# Patient Record
Sex: FEMALE | Race: WHITE | NOT HISPANIC OR LATINO | Employment: UNEMPLOYED | URBAN - METROPOLITAN AREA
[De-identification: names, ages, dates, MRNs, and addresses within clinical notes are randomized per-mention and may not be internally consistent; named-entity substitution may affect disease eponyms.]

---

## 2022-09-08 ENCOUNTER — TREATMENT (OUTPATIENT)
Dept: PHYSICAL THERAPY | Facility: CLINIC | Age: 57
End: 2022-09-08

## 2022-09-08 DIAGNOSIS — R29.898 WEAKNESS OF BOTH LOWER EXTREMITIES: ICD-10-CM

## 2022-09-08 DIAGNOSIS — G35 MULTIPLE SCLEROSIS: Primary | ICD-10-CM

## 2022-09-08 DIAGNOSIS — R26.89 ABNORMALITY OF GAIT DUE TO IMPAIRMENT OF BALANCE: ICD-10-CM

## 2022-09-08 PROCEDURE — 97110 THERAPEUTIC EXERCISES: CPT | Performed by: PHYSICAL THERAPIST

## 2022-09-08 PROCEDURE — 97162 PT EVAL MOD COMPLEX 30 MIN: CPT | Performed by: PHYSICAL THERAPIST

## 2022-09-08 NOTE — PROGRESS NOTES
"  Physical Therapy Initial Evaluation and Plan of Care      Patient: Akila Goodrich   : 1965  Diagnosis/ICD-10 Code:  Multiple sclerosis (HCC) [G35]  Referring practitioner: Kristen Us MD  Date of Initial Visit: 2022  Today's Date: 2022  Patient seen for 1 sessions         Visit Diagnoses:     ICD-10-CM ICD-9-CM   1. Multiple sclerosis (HCC)  G35 340   2. Abnormality of gait due to impairment of balance  R26.89 781.2   3. Weakness of both lower extremities  R29.898 729.89       Subjective Questionnaire: LEFS: 47 ; ABC Scale: 72    Subjective Evaluation    History of Present Illness    Subjective comment: Pt is 56 y/o F who presents to therapy c/o balance issues with hx of falls secondary to MS. Pt reports she falls 3-4x per month but loses her balance and is able to catch herself multiple times per week. Pt states balance issues have been ongoing for several years but have been worsening over the last few months and states her  has been encouraging her to start PT. She reports difficulty balancing while reaching over head, walking on uneven ground, and navigating stairs without rail.Pain  No pain reported    Social Support  Lives in: multiple-level home  Lives with: spouse    Treatments  Previous treatment: medication (medication for MS)  Patient Goals  Patient goals for therapy: improved balance  Patient goal: \"be able to stand up easier\"         Objective          Strength/Myotome Testing     Left Hip   Planes of Motion   Flexion: 4-  Extension: 3+  Abduction: 4-  External rotation: 4  Internal rotation: 4    Right Hip   Planes of Motion   Flexion: 4-  Extension: 3+  Abduction: 3+  External rotation: 4  Internal rotation: 4    Left Knee   Flexion: 4-  Extension: 4+    Right Knee   Flexion: 4  Extension: 4+    Left Ankle/Foot   Dorsiflexion: 4+  Inversion: 4+  Eversion: 4+    Right Ankle/Foot   Dorsiflexion: 4+  Inversion: 4+  Eversion: 4+    Additional Strength Details  Lower abd: " 4-/5    Ambulation   Weight-Bearing Status   Assistive device used: none    Additional Weight-Bearing Status Details  Does not use AD but regularly but does take cane into her yard if she remembers    Ambulation: Level Surfaces   Ambulation without assistive device: independent    Observational Gait   Gait: asymmetric   Walking speed within functional limits.   Base of support: increased    Additional Observational Gait Details  B genu valgum, decreased B knee ext, increased pronation R, decreased df R    Functional Assessment     Single Leg Stance - Eyes Open   Left  Trial 1: 4 seconds  Trial 2: 7 seconds  Trial 3: 8 seconds  Average: 6.33 seconds     Right  Trial 1: 4 seconds  Trial 2: 2 seconds  Trial 3: 4 seconds  Average: 3.33 seconds     Comments  Sway with Romberg        Patient Education: Educated pt on HEP and POC.    Assessment & Plan     Assessment  Impairments: abnormal gait, abnormal or restricted ROM, activity intolerance, impaired balance, impaired physical strength, lacks appropriate home exercise program, safety issue and weight-bearing intolerance  Functional Limitations: walking and reaching overhead  Assessment details: Pt is 57 y.o. female who presents to therapy c/o balance issues with hx of falls secondary to MS. Per pt, she falls 3-4x per month but is able to steady herself after LOB multiple times per week. She states balance issues have been ongoing for several years but have worsened over the last few months. Denies N/T. Pt reports difficulty balancing while reaching over head, walking on uneven ground, and navigating stairs without rail. She also reports previous L knee injury, which occasionally causes pain and weakness. PMH significant for MS, arthritis, and depression. LEFS score 47, ABC Scale score 72. Deficits observed in gait, balance, BLE strength, and core strength. Pt currently ambulates without AD except on occasion when she remembers to take cane. Trialed STC in clinic today  and advised pt to utilize her cane on uneven surfaces as well in crowded situations or when she will be walking long distances. Pt requires skilled therapy addressing deficits in order to return to PLOF. Educated pt on HEP and POC, pt verbalized understanding.  Prognosis: good    Goals  Plan Goals: STG 3 Weeks:    Pt will demonstrate understanding of initial HEP without need for cueing.    Pt will demonstrate increased strength of B hip flex to 4/5 or greater for improvement stair navigation.    Pt will demonstrate safe and functional gait mechanics with use of cane for walking on uneven surfaces.    LTG by Discharge:    Pt will be independent with HEP for return to PLOF with decreased symptoms.    Pt will demonstrate increased strength of B hip flex, ext, and abd to 4+/5 or greater for greater stability in walking and navigating stairs.    Pt will ambulate community distances with most appropriate AD with gait mechanics WFL to for increased safety in ambulation.    Pt will demonstrate improvement in balance with increased SLS time to 15 sec or greater B for decreased risk of falls.    Plan  Therapy options: will be seen for skilled therapy services  Planned modality interventions: dry needling, TENS, thermotherapy (hydrocollator packs), ultrasound and cryotherapy  Planned therapy interventions: manual therapy, balance/weight-bearing training, flexibility, functional ROM exercises, gait training, home exercise program, joint mobilization, neuromuscular re-education, soft tissue mobilization, spinal/joint mobilization, strengthening, stretching and therapeutic activities  Frequency: 2x week  Duration in weeks: 12  Treatment plan discussed with: patient        History # of Personal Factors and/or Comorbidities: MODERATE (1-2)  Examination of Body System(s): # of elements: MODERATE (3)  Clinical Presentation: EVOLVING  Clinical Decision Making: MODERATE      Timed:         Manual Therapy:         mins  95188;      Therapeutic Exercise:    10     mins  70469;     Neuromuscular Norah:        mins  40779;    Therapeutic Activity:          mins  37911;     Gait Training:           mins  46731;     Ultrasound:          mins  04383;    Ionto                                   mins   74819  Self Care                            mins   99112      Un-Timed:  Electrical Stimulation:         mins  42647 ( );  Traction          mins 71837  Low Eval          Mins  72235  Mod Eval     45     Mins  73439  High Eval                            Mins  15237        Timed Treatment:   10   mins   Total Treatment:     55   mins      PT SIGNATURE: Edelmira Dawn PT   Physical Therapist  [place] License #: [id number]  [place] License #: [id number]    DATE TREATMENT INITIATED: 9/8/2022    Initial Certification  Certification Period: 9/8/2022 thru 12/6/2022  I certify that the therapy services are furnished while this patient is under my care.  The services outlined above are required by this patient, and will be reviewed every 90 days.      Physician Signature: _________________________  PHYSICIAN: Kristen Us MD   NPI: [id number]                                             DATE: _____________________________________    Please sign and return via fax to [phone]. Thank you, [facility].

## 2022-09-13 ENCOUNTER — TREATMENT (OUTPATIENT)
Dept: PHYSICAL THERAPY | Facility: CLINIC | Age: 57
End: 2022-09-13

## 2022-09-13 DIAGNOSIS — R29.898 WEAKNESS OF BOTH LOWER EXTREMITIES: ICD-10-CM

## 2022-09-13 DIAGNOSIS — R26.89 ABNORMALITY OF GAIT DUE TO IMPAIRMENT OF BALANCE: ICD-10-CM

## 2022-09-13 DIAGNOSIS — G35 MULTIPLE SCLEROSIS: Primary | ICD-10-CM

## 2022-09-13 PROCEDURE — 97530 THERAPEUTIC ACTIVITIES: CPT | Performed by: PHYSICAL THERAPIST

## 2022-09-13 PROCEDURE — 97112 NEUROMUSCULAR REEDUCATION: CPT | Performed by: PHYSICAL THERAPIST

## 2022-09-13 PROCEDURE — 97110 THERAPEUTIC EXERCISES: CPT | Performed by: PHYSICAL THERAPIST

## 2022-09-13 NOTE — PROGRESS NOTES
Physical Therapy Daily Treatment Note      Patient: Akila Goodrich   : 1965  Referring practitioner: Kristen Us MD  Date of Initial Visit: Type: THERAPY  Noted: 2022  Today's Date: 2022  Patient seen for 2 sessions         Akila Goodrich reports: I have been doing well with my exercises; no increased pain with exercises         Objective   See Exercise, Manual, and Modality Logs for complete treatment.       Assessment/Plan  Progressed balance and gait exercises today; updated and issued HEP. Pt with noted increased sway with narrow BROOKE on floor and foam; educated on balance strategies. Pt reports difficulty picking up her feet with gait; added dynamic gait today with most difficulty with tandem gait. Plan to add cup walks next session to improve foot clearance with gait and continue to progress static and dynamic balance.     Progress per Plan of Care and Progress strengthening /stabilization /functional activity           Timed:  Manual Therapy:    -     mins  97230;  Therapeutic Exercise:    20     mins  45022;     Neuromuscular Norah:    8    mins  55480;    Therapeutic Activity:     12     mins  54186;     Gait Training:      -     mins  50268;     Ultrasound:     -     mins  93246;      Untimed:  Electrical Stimulation:    -     mins  09822 ( );  Mechanical Traction:    -     mins  86856;   Dry needling:      -     mins  17866/    Timed Treatment:   40   mins   Total Treatment:     45   mins    Bertha Duran PT  Physical Therapist  License #: [id number]

## 2022-09-15 ENCOUNTER — TREATMENT (OUTPATIENT)
Dept: PHYSICAL THERAPY | Facility: CLINIC | Age: 57
End: 2022-09-15

## 2022-09-15 DIAGNOSIS — R26.89 ABNORMALITY OF GAIT DUE TO IMPAIRMENT OF BALANCE: ICD-10-CM

## 2022-09-15 DIAGNOSIS — R29.898 WEAKNESS OF BOTH LOWER EXTREMITIES: ICD-10-CM

## 2022-09-15 DIAGNOSIS — G35 MULTIPLE SCLEROSIS: Primary | ICD-10-CM

## 2022-09-15 PROCEDURE — 97530 THERAPEUTIC ACTIVITIES: CPT | Performed by: PHYSICAL THERAPIST

## 2022-09-15 PROCEDURE — 97112 NEUROMUSCULAR REEDUCATION: CPT | Performed by: PHYSICAL THERAPIST

## 2022-09-15 PROCEDURE — 97110 THERAPEUTIC EXERCISES: CPT | Performed by: PHYSICAL THERAPIST

## 2022-09-15 NOTE — PROGRESS NOTES
Physical Therapy Daily Treatment Note    Patient: Akila Goodrich   : 1965  Referring practitioner: Kristen Us MD  Date of Initial Visit: Type: THERAPY  Noted: 2022  Today's Date: 9/15/2022  Patient seen for 3 sessions       Visit Diagnoses:    ICD-10-CM ICD-9-CM   1. Multiple sclerosis (HCC)  G35 340   2. Abnormality of gait due to impairment of balance  R26.89 781.2   3. Weakness of both lower extremities  R29.898 729.89       Akila Goodrich reports: she denies having any increased pain or soreness from her last visit.      Subjective       Objective   See Exercise, Manual, and Modality Logs for complete treatment.       Assessment & Plan     Assessment    Assessment details: Pt is doing well with stabilization, gait, balance and LE strengthening exercises.  Pt denies having any increased pain or soreness from the exercises.  Pt does need verbal cueing to heel strike to toe off during gait.  Initiated cup walking today and pt was able to demonstrate some carry over into her normal gait after working on increasing step lengths and heel striking during cup walking.  Will continue to see pt 2x/week for gait, strengthening, and balance.            Progress per Plan of Care      Timed:         Manual Therapy:         mins  41496;     Therapeutic Exercise:   22      mins  18671;     Neuromuscular Norah:   8     mins  90565;    Therapeutic Activity:     12     mins  04820;     Gait Training:           mins  47940;     Ultrasound:          mins  82683;    Ionto                                   mins   76187  Self Care                            mins   60083  Canalith Repos         mins 63089      Un-Timed:  Electrical Stimulation:         mins  90901 ( );  Dry Needling          mins self-pay  Traction          mins 29282      Timed Treatment:  42    mins   Total Treatment:     42   mins    Arlette Teresa, PT  [place] License: [id number]

## 2022-09-20 ENCOUNTER — TREATMENT (OUTPATIENT)
Dept: PHYSICAL THERAPY | Facility: CLINIC | Age: 57
End: 2022-09-20

## 2022-09-20 DIAGNOSIS — R29.898 WEAKNESS OF BOTH LOWER EXTREMITIES: ICD-10-CM

## 2022-09-20 DIAGNOSIS — G35 MULTIPLE SCLEROSIS: Primary | ICD-10-CM

## 2022-09-20 DIAGNOSIS — R26.89 ABNORMALITY OF GAIT DUE TO IMPAIRMENT OF BALANCE: ICD-10-CM

## 2022-09-20 PROCEDURE — 97110 THERAPEUTIC EXERCISES: CPT | Performed by: PHYSICAL THERAPIST

## 2022-09-20 PROCEDURE — 97112 NEUROMUSCULAR REEDUCATION: CPT | Performed by: PHYSICAL THERAPIST

## 2022-09-20 PROCEDURE — 97530 THERAPEUTIC ACTIVITIES: CPT | Performed by: PHYSICAL THERAPIST

## 2022-09-20 NOTE — PROGRESS NOTES
Physical Therapy Daily Treatment Note      Patient: Akila Goodrich   : 1965  Referring practitioner: Kristen Us MD  Date of Initial Visit: Type: THERAPY  Noted: 2022  Today's Date: 2022  Patient seen for 4 sessions         Akila Goodrich reports: no soreness or pain; no falls since initiation of therapy        Objective   See Exercise, Manual, and Modality Logs for complete treatment.       Assessment/Plan  Continued to progress strength and balance; minimal cues for mat table strengthening exercises. Added nustep, cup walks, dynamic UE exercises on foam surface, uni-clock and rocker board static balance holds today for improved strength, endurance and balance with functional mobility. Pt needing intermittent hand hold assist/CGA for tandem gait and initially with cup walks and intermittent use of handrail with rocker board balance. Plan to continue to progress as appropriate to decrease falls risk.       Progress per Plan of Care and Progress strengthening /stabilization /functional activity         Timed:  Manual Therapy:    -     mins  11301;  Therapeutic Exercise:    25     mins  81590;     Neuromuscular Norah:    18    mins  81211;    Therapeutic Activity:     10     mins  97554;     Gait Training:      -     mins  05237;     Ultrasound:     -     mins  56336;      Untimed:  Electrical Stimulation:    -     mins  74772 ( );  Mechanical Traction:    -     mins  78247;   Dry needling:      -     mins  81640/    Timed Treatment:   53   mins   Total Treatment:     55   mins  Bertha Duran PT  Physical Therapist    License #: [id number]

## 2022-09-22 ENCOUNTER — TREATMENT (OUTPATIENT)
Dept: PHYSICAL THERAPY | Facility: CLINIC | Age: 57
End: 2022-09-22

## 2022-09-22 DIAGNOSIS — R26.89 ABNORMALITY OF GAIT DUE TO IMPAIRMENT OF BALANCE: ICD-10-CM

## 2022-09-22 DIAGNOSIS — R29.898 WEAKNESS OF BOTH LOWER EXTREMITIES: ICD-10-CM

## 2022-09-22 DIAGNOSIS — G35 MULTIPLE SCLEROSIS: Primary | ICD-10-CM

## 2022-09-22 PROCEDURE — 97110 THERAPEUTIC EXERCISES: CPT | Performed by: PHYSICAL THERAPIST

## 2022-09-22 PROCEDURE — 97112 NEUROMUSCULAR REEDUCATION: CPT | Performed by: PHYSICAL THERAPIST

## 2022-09-22 NOTE — PROGRESS NOTES
Physical Therapy Daily Treatment Note      Patient: Akila Goodrich   : 1965  Referring practitioner: Kristen Us MD  Date of Initial Visit: Type: THERAPY  Noted: 2022  Today's Date: 2022  Patient seen for 5 sessions       Visit Diagnoses:    ICD-10-CM ICD-9-CM   1. Multiple sclerosis (HCC)  G35 340   2. Abnormality of gait due to impairment of balance  R26.89 781.2   3. Weakness of both lower extremities  R29.898 729.89       Subjective   Akila Goodrich reports: doing well with HEP and feeling more stable.  Pt notes that it is getting easier to roll over.     Objective   See Exercise, Manual, and Modality Logs for complete treatment.       Assessment/Plan   Pt noting fatigue with SLR/mild extensor lag requiring rest breaks. Pt demonstrates good form with mat ex requiring few cues. Pt was challenge with single leg support activities and benefits from verbal cuing to achieve hip dominant squat with good knee alignment.   Progress per Plan of Care             Timed:         Manual Therapy:    -     mins  27626;     Therapeutic Exercise:    20     mins  93774;     Neuromuscular Norah:    25    mins  83414;    Therapeutic Activity:     -     mins  70783;     Gait Training:      -     mins  61094;     Ultrasound:     -     mins  26962;    Ionto                               -    mins   92048  Self Care                       -     mins   77847        Timed Treatment:   45   mins   Total Treatment:     50   mins    KACI Garay License: #[id number]

## 2022-09-27 ENCOUNTER — TREATMENT (OUTPATIENT)
Dept: PHYSICAL THERAPY | Facility: CLINIC | Age: 57
End: 2022-09-27

## 2022-09-27 DIAGNOSIS — G35 MULTIPLE SCLEROSIS: Primary | ICD-10-CM

## 2022-09-27 DIAGNOSIS — R29.898 WEAKNESS OF BOTH LOWER EXTREMITIES: ICD-10-CM

## 2022-09-27 DIAGNOSIS — R26.89 ABNORMALITY OF GAIT DUE TO IMPAIRMENT OF BALANCE: ICD-10-CM

## 2022-09-27 PROCEDURE — 97112 NEUROMUSCULAR REEDUCATION: CPT | Performed by: PHYSICAL THERAPIST

## 2022-09-27 PROCEDURE — 97110 THERAPEUTIC EXERCISES: CPT | Performed by: PHYSICAL THERAPIST

## 2022-09-27 PROCEDURE — 97530 THERAPEUTIC ACTIVITIES: CPT | Performed by: PHYSICAL THERAPIST

## 2022-09-27 NOTE — PROGRESS NOTES
"   Physical Therapy Daily Treatment Note      Patient: Akila Goodrich   : 1965  Referring practitioner: Kristen Us MD  Date of Initial Visit: Type: THERAPY  Noted: 2022  Today's Date: 2022  Patient seen for 6 sessions         Akila Goodrich reports: getting on and off the floor is getting easier; L knee hurt after little last session, tightness noted today with knee flexion. Pt reports she is a little tired and fatigued today with noted increased imbalance with gait around the clinic; \"just one of those days with my MS.\" Pt reports she has \"those days\" intermittently randomly; unrelated to previous activity.      Objective   See Exercise, Manual, and Modality Logs for complete treatment.       Assessment/Plan   No additional exercises added today due to increased fatigue and imbalance with MS today. Noted decreased foot clearance and forward trunk with gait today; cues for posture with standing and increased need for UE support with balance activities today. Improving functional strength noted with bed mobility on/off mat today. Pt requires cues for squat technique today with visual/verbal cues and demonstration with improved understanding. No c/o increased knee pain with exercises today.       Progress per Plan of Care and Progress strengthening /stabilization /functional activity           Timed:  Manual Therapy:    -     mins  16754;  Therapeutic Exercise:    31     mins  28176;     Neuromuscular Norah:    13    mins  40873;    Therapeutic Activity:     10     mins  29457;     Gait Training:      -     mins  03619;     Ultrasound:     -     mins  66091;      Untimed:  Electrical Stimulation:    -     mins  29469 ( );  Mechanical Traction:    -     mins  18758;   Dry needling:      -     mins  05263/    Timed Treatment:   54   mins   Total Treatment:     60   mins    Bertha Duran PT  Physical Therapist  License #: [id number]                "

## 2022-09-29 ENCOUNTER — TREATMENT (OUTPATIENT)
Dept: PHYSICAL THERAPY | Facility: CLINIC | Age: 57
End: 2022-09-29

## 2022-09-29 DIAGNOSIS — R29.898 WEAKNESS OF BOTH LOWER EXTREMITIES: ICD-10-CM

## 2022-09-29 DIAGNOSIS — G35 MULTIPLE SCLEROSIS: Primary | ICD-10-CM

## 2022-09-29 DIAGNOSIS — R26.89 ABNORMALITY OF GAIT DUE TO IMPAIRMENT OF BALANCE: ICD-10-CM

## 2022-09-29 PROCEDURE — 97112 NEUROMUSCULAR REEDUCATION: CPT | Performed by: PHYSICAL THERAPIST

## 2022-09-29 PROCEDURE — 97110 THERAPEUTIC EXERCISES: CPT | Performed by: PHYSICAL THERAPIST

## 2022-09-29 NOTE — PROGRESS NOTES
Physical Therapy Daily Treatment Note      Patient: Akila Goodrich   : 1965  Referring practitioner: Kristen Us MD  Date of Initial Visit: Type: THERAPY  Noted: 2022  Today's Date: 2022  Patient seen for 7 sessions       Visit Diagnoses:    ICD-10-CM ICD-9-CM   1. Multiple sclerosis (HCC)  G35 340   2. Abnormality of gait due to impairment of balance  R26.89 781.2   3. Weakness of both lower extremities  R29.898 729.89       Subjective   Akila Goodrich reports: my L knee is bothering me still    Objective   See Exercise, Manual, and Modality Logs for complete treatment.       Assessment/Plan   Vestibular ex were modified to offload L knee as much as possible with good tolerance.  Emphasis on SBA only to allow pt to be more aware of balance deficits. Pt was educated on benefits of ankle strategy to prevent falls. Added [employer] pew with good technique to address limits of stability with printout for compliance/recall.  Progress per Plan of Care             Timed:         Manual Therapy:    -     mins  83401;     Therapeutic Exercise:    20     mins  80888;     Neuromuscular Norah:    30    mins  42526;    Therapeutic Activity:     -     mins  60596;     Gait Training:      -     mins  17231;     Ultrasound:     -     mins  05910;    Ionto                               -    mins   71526  Self Care                       -     mins   18034      Timed Treatment:   50   mins   Total Treatment:     50   mins    KACI Garay License: #[id number]

## 2022-10-18 ENCOUNTER — OFFICE VISIT (OUTPATIENT)
Dept: FAMILY MEDICINE CLINIC | Facility: CLINIC | Age: 57
End: 2022-10-18

## 2022-10-18 VITALS
DIASTOLIC BLOOD PRESSURE: 80 MMHG | OXYGEN SATURATION: 100 % | TEMPERATURE: 96.9 F | WEIGHT: 231 LBS | SYSTOLIC BLOOD PRESSURE: 127 MMHG | HEIGHT: 68 IN | HEART RATE: 71 BPM | BODY MASS INDEX: 35.01 KG/M2

## 2022-10-18 DIAGNOSIS — Z76.89 ENCOUNTER TO ESTABLISH CARE: Primary | ICD-10-CM

## 2022-10-18 DIAGNOSIS — G35 MULTIPLE SCLEROSIS: ICD-10-CM

## 2022-10-18 DIAGNOSIS — M25.50 POLYARTHRALGIA: ICD-10-CM

## 2022-10-18 DIAGNOSIS — F33.41 RECURRENT MAJOR DEPRESSIVE DISORDER, IN PARTIAL REMISSION: ICD-10-CM

## 2022-10-18 DIAGNOSIS — Z00.00 HEALTHCARE MAINTENANCE: ICD-10-CM

## 2022-10-18 DIAGNOSIS — Z23 FLU VACCINE NEED: ICD-10-CM

## 2022-10-18 DIAGNOSIS — F51.04 PSYCHOPHYSIOLOGICAL INSOMNIA: ICD-10-CM

## 2022-10-18 DIAGNOSIS — K21.9 GASTROESOPHAGEAL REFLUX DISEASE, UNSPECIFIED WHETHER ESOPHAGITIS PRESENT: ICD-10-CM

## 2022-10-18 PROCEDURE — 90471 IMMUNIZATION ADMIN: CPT | Performed by: NURSE PRACTITIONER

## 2022-10-18 PROCEDURE — 99204 OFFICE O/P NEW MOD 45 MIN: CPT | Performed by: NURSE PRACTITIONER

## 2022-10-18 PROCEDURE — 90686 IIV4 VACC NO PRSV 0.5 ML IM: CPT | Performed by: NURSE PRACTITIONER

## 2022-10-18 RX ORDER — TRAMADOL HYDROCHLORIDE 50 MG/1
TABLET ORAL
COMMUNITY
Start: 2022-07-31 | End: 2023-02-01 | Stop reason: SDUPTHER

## 2022-10-18 RX ORDER — CYCLOBENZAPRINE HCL 10 MG
1 TABLET ORAL 3 TIMES DAILY PRN
COMMUNITY
Start: 2022-05-19 | End: 2023-02-01 | Stop reason: SDUPTHER

## 2022-10-18 RX ORDER — OXYBUTYNIN CHLORIDE 15 MG/1
15 TABLET, EXTENDED RELEASE ORAL DAILY
COMMUNITY
Start: 2022-08-14 | End: 2023-02-01 | Stop reason: SDUPTHER

## 2022-10-18 RX ORDER — MELOXICAM 15 MG/1
15 TABLET ORAL DAILY
COMMUNITY
Start: 2022-08-14 | End: 2023-02-01 | Stop reason: SDUPTHER

## 2022-10-18 RX ORDER — OXYBUTYNIN CHLORIDE 5 MG/1
5 TABLET, EXTENDED RELEASE ORAL DAILY
COMMUNITY
Start: 2022-08-14 | End: 2023-02-01 | Stop reason: SDUPTHER

## 2022-10-18 RX ORDER — FLUTICASONE PROPIONATE 50 MCG
2 SPRAY, SUSPENSION (ML) NASAL DAILY
COMMUNITY

## 2022-10-18 RX ORDER — GLATIRAMER 40 MG/ML
INJECTION, SOLUTION SUBCUTANEOUS
COMMUNITY
Start: 2022-08-18

## 2022-10-18 RX ORDER — OMEPRAZOLE 20 MG/1
20 CAPSULE, DELAYED RELEASE ORAL DAILY
COMMUNITY

## 2022-10-18 RX ORDER — CETIRIZINE HYDROCHLORIDE 10 MG/1
10 TABLET ORAL DAILY
COMMUNITY

## 2022-10-18 RX ORDER — BUPROPION HYDROCHLORIDE 150 MG/1
150 TABLET ORAL
COMMUNITY
End: 2023-02-01 | Stop reason: SDUPTHER

## 2022-10-18 NOTE — PROGRESS NOTES
"Chief Complaint  Establish Care (New pt )    Subjective        Akila Goodrich presents to [facility] PRIMARY [facility]ARE  History of Present Illness   Referral to GYN and neuro for MS  Left trigger finger  Polyarthralgia  B/l hips and LBP  Colon cancer     The patient Akila Goodrich is a 56-year-old female who presents today as a new patient to establish care. She is accompanied by her granddaughter today.    Medical history  She reports previously following with Dr. Aniceto Rizvi MD, PCP, at [place], [place]. She has requested a referral for a physician that feels more personable. She reports currently seeing all providers in [facility]. She prefers to have new referrals for all providers to a closer location and [facility] facility. She would also like a referral for a neurologist within this current location/building. Her most recent physical examination was completed in 03/2022.     Family history  The patient reports completing genetic testing for cancer screening. She reports 2 sisters had breast cancer. Her mother had colon cancer and father had liver cancer.     Multiple sclerosis, fatigue, and fall injuries  She reports doing well with her multiple sclerosis. She does follow up with neurology. The patient reports constant symptoms of fatigue. She is able to awake in the morning but feels exhausted. She stopped working in the past due to a lack of staying power and then returned to work for 1 month. She reports previously doing physical therapy at [facility], operated by [facility] with good improvement. However, she does report having a \"horrible\" balance. The patient reports being high risk for falls due to feeling unbalanced. She previously had large bruises of the legs from falling, according to the granddaughter. She reports falling on a pile of clothes after walking in her daughter's room most recently. She also fell after tripping over her dog and was " "not able to get up and crawled over to the kitchen chair. She reports starting to use a cane, a walking cane, or someone's arm when walking. She reports managing her symptoms well. She is seen at the [facility].  Doing well on current therapy other than brain fog.  She also reports symptoms of brain fog.    Anxiety  The patient reports having symptoms of anxiety when driving in traffic-has trouble driving much distance which is another reason she would like to have providers here at [facility] as possible    Depression  She reports discontinuing Wellbutrin in the past. She reports having family stress due to the passing of her father and moved to [place] aft leaving [place]. She then re-started the Wellbutrin 300 mg due to work and family stress, 2021. The patient reports being recommended by Dr. Rizvi to taper off the Wellbutrin 150 mg. She discontinued use again and then returned to the medication due to current symptoms. She takes Wellbutrin in the morning.    Insomnia   She reports falling asleep on the couch and then moves to the bed and is unable to sleep. Her brain stays awake and then she gets up and uses her phone. She eventually falls asleep. She denies taking sleep medication. She denies ever taking melatonin.     Arthritis  She takes meloxicam for arthritis. She also reports occasionally having trigger finger symptom in the morning. The patient reports \"popping\" her finger due to stiffness but reports having pain afterwards.     Bursitis, hip, lumbar spine, and bilateral shoulders  She has symptoms of bursitis of the hip and spine. She reports seeing a physician in [place] due to hip and severe lumbar spine pain and was diagnosed with bursitis.  She reports severe lumbar spine pain when touched. The physician recommended an injection or spine and hip exercises, during this time. She reports trying to do lay down exercises every morning before getting out of " "bed. She also reports doing stability exercises. She reports having difficulty doing certain exercises due to knee pain when lifting. She reports still having pain after taking meloxicam. She does manage her pain. She takes tramadol \"very rarely\" and cyclobenzaprine when having severe pain. She refilled the tramadol in 07/2022. She denies having middle spine pain. She reports occasionally having radiating pain across her shoulders.     Knee pain  The patient reports symptoms of knee pain.     Acid reflux and heart burn  She takes Prilosec for acid reflux. She drinks pickle juice at night when having acid reflux with some relief. She reports previously completing an endoscopy  in [place]. She reports having a small tear, but her symptom did not require medical treatment according to Dr. Maher at [facility]. She was recommended to monitor her diet and take Prilosec in the morning, daily.    Urinary conditions, history of UTI  She reports taking oxybutynin for urinary abnormalities/conditions. She denies having a UTI in a while. She reports previously having re-occurring UTI symptoms/infections.     History of bilateral legs and right arm weakness  She reports previously having symptoms of increased weakness of the right arm and bilateral legs. She reports doing PT during this time with good effectiveness. She has been referred for an MRI of the neck and spine due to symptoms of weakness. She denies receiving a call to schedule the MRI of the spine and neck. She is to complete or schedule the imaging appointment through [facility].     History of the COVID-19 virus  The patient reports vladimir the COVID-19 virus twice, 1 time in 2019 and another time in 05/2022 or 07/2022.      Health maintenance   She denies having any abnormal mammograms in the past. She reports receiving the flu vaccinations, regularly. She denies receiving the flu vaccine this year. She refuses the COVID-19 " "vaccine, today. She reports having a colonoscopy in 2019 before leaving [place]. She reports having a polyp in the first colonoscopy. She denies having any discomfort due to the polyp. She reports having normal results in the second colonoscopy, 2019. She is due for a colonoscopy in 2023.     Medical questions  The patient denies having any colon conditions or abnormal bowel movements. She denies any chest pain, heart palpitations, shortness of breath, or cough. She has never had to see a cardiologist for any reason. She denies having any kidney conditions, dizziness, ear or throat abnormalities, trouble swallowing, head pain or dizziness. She reports drinking plenty of water.       Objective   Vital Signs:  /80   Pulse 71   Temp 96.9 °F (36.1 °C)   Ht 172.7 cm (68\")   Wt 105 kg (231 lb)   SpO2 100%   BMI 35.12 kg/m²   Estimated body mass index is 35.12 kg/m² as calculated from the following:    Height as of this encounter: 172.7 cm (68\").    Weight as of this encounter: 105 kg (231 lb).    Class 2 Severe Obesity (BMI >=35 and <=39.9). Obesity-related health conditions include the following: GERD and osteoarthritis. Obesity is newly identified. BMI is is above average; BMI management plan is completed. We discussed portion control and increasing exercise.      Physical Exam  Vitals and nursing note reviewed.   Constitutional:       General: She is not in acute distress.     Appearance: She is well-developed. She is not ill-appearing or diaphoretic.   HENT:      Head: Normocephalic and atraumatic.      Right Ear: Tympanic membrane, ear canal and external ear normal.      Left Ear: Tympanic membrane, ear canal and external ear normal.      Mouth/Throat:      Mouth: Mucous membranes are moist.      Pharynx: No posterior oropharyngeal erythema.   Eyes:      General: No scleral icterus.        Right eye: No discharge.         Left eye: No discharge.      Conjunctiva/sclera: Conjunctivae normal. "   Cardiovascular:      Rate and Rhythm: Normal rate and regular rhythm.      Heart sounds: Normal heart sounds.   Pulmonary:      Effort: Pulmonary effort is normal.      Breath sounds: Normal breath sounds.   Abdominal:      General: Bowel sounds are normal.      Palpations: Abdomen is soft.      Tenderness: There is no abdominal tenderness.   Musculoskeletal:         General: No deformity.      Comments: Generalized weakness-steady to ambulate without assist-abnl gait   Skin:     General: Skin is warm and dry.   Neurological:      Mental Status: She is alert and oriented to person, place, and time.   Psychiatric:         Mood and Affect: Mood normal.         Behavior: Behavior normal.         Thought Content: Thought content normal.      Comments: Pleasant, conversant, had to reach for words occasionally        Result Review :                Assessment and Plan   Diagnoses and all orders for this visit:    1. Encounter to establish care (Primary)    2. Multiple sclerosis (HCC)  -     Ambulatory Referral to Neurology    3. Polyarthralgia    4. Psychophysiological insomnia    5. Flu vaccine need  -     FluLaval/Fluzone >6 mos ([id number])    6. Healthcare maintenance  -     Ambulatory Referral to Gynecology      Multiple sclerosis, fatigue, and fall injuries  - The patient will continue to follow up with the [facility].  - The patient will have an MRI of the brain,  neck and spine due to symptoms of weakness at [facility].   11/29/2022.   - She can follow up with PT.   - The patient has requested a new neurologist provider in the area.     - The patient will follow up in 3 months.  - She will continue to use assistive devices for ambulation  -Discussed importance of for fall prevention     Anxiety and depression, family and work stress  - The patient will continue to take Wellbutrin 150 mg in the morning.     polyarthralgia  - The patient will complete an MRI of the neck  and thoracic spine.  -- The patient will have a controlled substance agreement today.  -yuridia reviewed and appropriate-will continue tramadol for chronic polyarthralgia.   -UDS next visit  - She will continue taking meloxicam and cyclobenzaprine prn as primaries when having severe pain.  Stretching and stability exercise encouraged    . Mammogram  - The patient is scheduled to have a mammogram on 11/29/2022, ordered by the GYN.  - She has requested a new referral for GYN.      Insomnia   - She has been recommended to take melatonin 5 mg to start and then 10 mg.     Acid reflux and heart burn  - She will continue taking Prilosec for acid reflux.   - She will continue to monitor her diet.   - She can follow up with Dr. Maher at [facility].     Urinary conditions, history of UTI  - She will continue taking oxybutynin for urinary abnormalities/conditions per neuro     Flu vaccine  - She will receive a flu vaccine, today.              Follow Up   Return in about 3 months (around 1/18/2023) for Recheck.  Patient was given instructions and counseling regarding her condition or for health maintenance advice. Please see specific information pulled into the AVS if appropriate.       Transcribed from ambient dictation for ADRIAN Nina by Val Merrill.  10/18/22   21:19 EDT    Patient or patient representative verbalized consent to the visit recording.  I have personally performed the services described in this document as transcribed by the above individual, and it is both accurate and complete.

## 2022-10-19 PROBLEM — K21.9 GASTROESOPHAGEAL REFLUX DISEASE: Status: ACTIVE | Noted: 2022-10-19

## 2022-10-19 PROBLEM — F51.04 PSYCHOPHYSIOLOGICAL INSOMNIA: Status: ACTIVE | Noted: 2022-10-19

## 2022-10-19 PROBLEM — F33.41 RECURRENT MAJOR DEPRESSIVE DISORDER, IN PARTIAL REMISSION: Status: ACTIVE | Noted: 2022-10-19

## 2022-10-19 PROBLEM — G35 MULTIPLE SCLEROSIS: Status: ACTIVE | Noted: 2022-10-19

## 2022-10-19 PROBLEM — M25.50 POLYARTHRALGIA: Status: ACTIVE | Noted: 2022-10-19

## 2022-10-20 ENCOUNTER — PATIENT ROUNDING (BHMG ONLY) (OUTPATIENT)
Dept: FAMILY MEDICINE CLINIC | Facility: CLINIC | Age: 57
End: 2022-10-20

## 2022-10-20 NOTE — PROGRESS NOTES
A My-Chart message has been sent to the patient for PATIENT ROUNDING with [facility]

## 2023-02-01 ENCOUNTER — OFFICE VISIT (OUTPATIENT)
Dept: FAMILY MEDICINE CLINIC | Facility: CLINIC | Age: 58
End: 2023-02-01
Payer: COMMERCIAL

## 2023-02-01 ENCOUNTER — LAB (OUTPATIENT)
Dept: LAB | Facility: HOSPITAL | Age: 58
End: 2023-02-01
Payer: COMMERCIAL

## 2023-02-01 VITALS
DIASTOLIC BLOOD PRESSURE: 85 MMHG | HEART RATE: 67 BPM | OXYGEN SATURATION: 100 % | BODY MASS INDEX: 35.77 KG/M2 | SYSTOLIC BLOOD PRESSURE: 112 MMHG | TEMPERATURE: 97.1 F | HEIGHT: 68 IN | WEIGHT: 236 LBS

## 2023-02-01 DIAGNOSIS — M54.50 CHRONIC BILATERAL LOW BACK PAIN WITHOUT SCIATICA: ICD-10-CM

## 2023-02-01 DIAGNOSIS — G35 MULTIPLE SCLEROSIS: ICD-10-CM

## 2023-02-01 DIAGNOSIS — M25.50 POLYARTHRALGIA: ICD-10-CM

## 2023-02-01 DIAGNOSIS — F33.41 RECURRENT MAJOR DEPRESSIVE DISORDER, IN PARTIAL REMISSION: ICD-10-CM

## 2023-02-01 DIAGNOSIS — N32.81 OVERACTIVE BLADDER: ICD-10-CM

## 2023-02-01 DIAGNOSIS — G89.29 CHRONIC BILATERAL LOW BACK PAIN WITHOUT SCIATICA: ICD-10-CM

## 2023-02-01 DIAGNOSIS — Z00.00 ROUTINE GENERAL MEDICAL EXAMINATION AT A HEALTH CARE FACILITY: Primary | ICD-10-CM

## 2023-02-01 PROBLEM — R13.10 DYSPHAGIA: Status: ACTIVE | Noted: 2019-11-18

## 2023-02-01 PROBLEM — E04.1 THYROID NODULE: Status: ACTIVE | Noted: 2017-05-04

## 2023-02-01 PROBLEM — K44.9 PARAESOPHAGEAL HERNIA: Status: ACTIVE | Noted: 2022-03-01

## 2023-02-01 PROBLEM — Z15.01 BRCA1 GENE MUTATION POSITIVE: Status: ACTIVE | Noted: 2022-04-22

## 2023-02-01 PROBLEM — E78.5 HYPERLIPIDEMIA: Status: ACTIVE | Noted: 2022-03-02

## 2023-02-01 PROBLEM — Z15.09 BRCA1 GENE MUTATION POSITIVE: Status: ACTIVE | Noted: 2022-04-22

## 2023-02-01 PROBLEM — Z80.0 FAMILY HISTORY OF COLON CANCER: Status: ACTIVE | Noted: 2019-11-18

## 2023-02-01 PROBLEM — Z84.81 FAMILY HISTORY OF BRCA GENE POSITIVE: Status: ACTIVE | Noted: 2022-03-30

## 2023-02-01 LAB
ALBUMIN SERPL-MCNC: 4.1 G/DL (ref 3.5–5.2)
ALBUMIN/GLOB SERPL: 1.2 G/DL
ALP SERPL-CCNC: 73 U/L (ref 39–117)
ALT SERPL W P-5'-P-CCNC: 12 U/L (ref 1–33)
ANION GAP SERPL CALCULATED.3IONS-SCNC: 7.8 MMOL/L (ref 5–15)
AST SERPL-CCNC: 20 U/L (ref 1–32)
BASOPHILS # BLD AUTO: 0.1 10*3/MM3 (ref 0–0.2)
BASOPHILS NFR BLD AUTO: 1.6 % (ref 0–1.5)
BILIRUB SERPL-MCNC: 0.5 MG/DL (ref 0–1.2)
BUN SERPL-MCNC: 23 MG/DL (ref 6–20)
BUN/CREAT SERPL: 25.3 (ref 7–25)
CALCIUM SPEC-SCNC: 9.4 MG/DL (ref 8.6–10.5)
CHLORIDE SERPL-SCNC: 106 MMOL/L (ref 98–107)
CHOLEST SERPL-MCNC: 185 MG/DL (ref 0–200)
CO2 SERPL-SCNC: 26.2 MMOL/L (ref 22–29)
CREAT SERPL-MCNC: 0.91 MG/DL (ref 0.57–1)
DEPRECATED RDW RBC AUTO: 44.1 FL (ref 37–54)
EGFRCR SERPLBLD CKD-EPI 2021: 73.7 ML/MIN/1.73
EOSINOPHIL # BLD AUTO: 0.19 10*3/MM3 (ref 0–0.4)
EOSINOPHIL NFR BLD AUTO: 3.1 % (ref 0.3–6.2)
ERYTHROCYTE [DISTWIDTH] IN BLOOD BY AUTOMATED COUNT: 13.4 % (ref 12.3–15.4)
GLOBULIN UR ELPH-MCNC: 3.3 GM/DL
GLUCOSE SERPL-MCNC: 95 MG/DL (ref 65–99)
HBA1C MFR BLD: 5.5 % (ref 4.8–5.6)
HCT VFR BLD AUTO: 40.8 % (ref 34–46.6)
HDLC SERPL-MCNC: 70 MG/DL (ref 40–60)
HGB BLD-MCNC: 12.8 G/DL (ref 12–15.9)
IMM GRANULOCYTES # BLD AUTO: 0.02 10*3/MM3 (ref 0–0.05)
IMM GRANULOCYTES NFR BLD AUTO: 0.3 % (ref 0–0.5)
LDLC SERPL CALC-MCNC: 105 MG/DL (ref 0–100)
LDLC/HDLC SERPL: 1.49 {RATIO}
LYMPHOCYTES # BLD AUTO: 1.92 10*3/MM3 (ref 0.7–3.1)
LYMPHOCYTES NFR BLD AUTO: 31.2 % (ref 19.6–45.3)
MCH RBC QN AUTO: 28.4 PG (ref 26.6–33)
MCHC RBC AUTO-ENTMCNC: 31.4 G/DL (ref 31.5–35.7)
MCV RBC AUTO: 90.5 FL (ref 79–97)
MONOCYTES # BLD AUTO: 0.92 10*3/MM3 (ref 0.1–0.9)
MONOCYTES NFR BLD AUTO: 15 % (ref 5–12)
NEUTROPHILS NFR BLD AUTO: 3 10*3/MM3 (ref 1.7–7)
NEUTROPHILS NFR BLD AUTO: 48.8 % (ref 42.7–76)
NRBC BLD AUTO-RTO: 0 /100 WBC (ref 0–0.2)
PLATELET # BLD AUTO: 196 10*3/MM3 (ref 140–450)
PMV BLD AUTO: 9.2 FL (ref 6–12)
POTASSIUM SERPL-SCNC: 4.8 MMOL/L (ref 3.5–5.2)
PROT SERPL-MCNC: 7.4 G/DL (ref 6–8.5)
RBC # BLD AUTO: 4.51 10*6/MM3 (ref 3.77–5.28)
SODIUM SERPL-SCNC: 140 MMOL/L (ref 136–145)
TRIGL SERPL-MCNC: 52 MG/DL (ref 0–150)
TSH SERPL DL<=0.05 MIU/L-ACNC: 1.47 UIU/ML (ref 0.27–4.2)
VIT B12 BLD-MCNC: 346 PG/ML (ref 211–946)
VLDLC SERPL-MCNC: 10 MG/DL (ref 5–40)
WBC NRBC COR # BLD: 6.15 10*3/MM3 (ref 3.4–10.8)

## 2023-02-01 PROCEDURE — 80061 LIPID PANEL: CPT | Performed by: NURSE PRACTITIONER

## 2023-02-01 PROCEDURE — 99396 PREV VISIT EST AGE 40-64: CPT | Performed by: NURSE PRACTITIONER

## 2023-02-01 PROCEDURE — 83036 HEMOGLOBIN GLYCOSYLATED A1C: CPT | Performed by: NURSE PRACTITIONER

## 2023-02-01 PROCEDURE — 80307 DRUG TEST PRSMV CHEM ANLYZR: CPT | Performed by: NURSE PRACTITIONER

## 2023-02-01 PROCEDURE — 80050 GENERAL HEALTH PANEL: CPT | Performed by: NURSE PRACTITIONER

## 2023-02-01 PROCEDURE — 36415 COLL VENOUS BLD VENIPUNCTURE: CPT | Performed by: NURSE PRACTITIONER

## 2023-02-01 PROCEDURE — 82607 VITAMIN B-12: CPT | Performed by: NURSE PRACTITIONER

## 2023-02-01 RX ORDER — TRAMADOL HYDROCHLORIDE 50 MG/1
50 TABLET ORAL EVERY 8 HOURS PRN
Qty: 30 TABLET | Refills: 2 | Status: SHIPPED | OUTPATIENT
Start: 2023-02-01

## 2023-02-01 RX ORDER — OXYBUTYNIN CHLORIDE 15 MG/1
15 TABLET, EXTENDED RELEASE ORAL DAILY
Qty: 90 TABLET | Refills: 1 | Status: SHIPPED | OUTPATIENT
Start: 2023-02-01

## 2023-02-01 RX ORDER — CYCLOBENZAPRINE HCL 10 MG
10 TABLET ORAL 3 TIMES DAILY PRN
Qty: 60 TABLET | Refills: 2 | Status: SHIPPED | OUTPATIENT
Start: 2023-02-01

## 2023-02-01 RX ORDER — BUPROPION HYDROCHLORIDE 150 MG/1
150 TABLET ORAL EVERY MORNING
Qty: 90 TABLET | Refills: 1 | Status: SHIPPED | OUTPATIENT
Start: 2023-02-01

## 2023-02-01 RX ORDER — OXYBUTYNIN CHLORIDE 5 MG/1
5 TABLET, EXTENDED RELEASE ORAL DAILY
Qty: 90 TABLET | Refills: 1 | Status: SHIPPED | OUTPATIENT
Start: 2023-02-01

## 2023-02-01 RX ORDER — MELOXICAM 15 MG/1
15 TABLET ORAL DAILY
Qty: 90 TABLET | Refills: 1 | Status: SHIPPED | OUTPATIENT
Start: 2023-02-01

## 2023-02-01 NOTE — PROGRESS NOTES
"Chief Complaint  Annual Exam (Physical )    Subjective        Akila Goodrich presents to [facility] PRIMARY [facility]ARE  History of Present Illness patient here today for physical.  Health goal: lose weight.   Health concern: Tired often, sleeping better    Has history of MS and followed at [facility] MS center.  Everything has been stable since last visit.    Patient has had ongoing muscle pain of her left neck, polyarthralgia since falling over her dog and hurting her back-horrible balance, and has been treated by PT.  Chronic muscle pain left neck, low back without sciatica controlled with NSAID, tramadol, flexeril per previous PCP. Takes tramadol infrequently-only when nothing else has helped.  MRI last May-came back good.     Continues wellbutrin for depression and finds it helpful.      Acid reflux better if she does not eat at night    OAB stable on current meds without side effects.  Takes 20 mg day.     Objective   Vital Signs:  /85   Pulse 67   Temp 97.1 °F (36.2 °C)   Ht 172.7 cm (68\")   Wt 107 kg (236 lb)   SpO2 100%   BMI 35.88 kg/m²   Estimated body mass index is 35.88 kg/m² as calculated from the following:    Height as of this encounter: 172.7 cm (68\").    Weight as of this encounter: 107 kg (236 lb).       Class 2 Severe Obesity (BMI >=35 and <=39.9). Obesity-related health conditions include the following: GERD and idiopathic intracranial hypertension. Obesity is worsening. BMI is is above average; BMI management plan is completed. We discussed portion control and increasing exercise.      Physical Exam  Vitals and nursing note reviewed.   Constitutional:       General: She is not in acute distress.     Appearance: She is well-developed. She is not ill-appearing.   HENT:      Head: Normocephalic and atraumatic.      Right Ear: Tympanic membrane, ear canal and external ear normal.      Left Ear: Tympanic membrane, ear canal and external ear normal.      Mouth/Throat:      Mouth: " Mucous membranes are moist.      Pharynx: Uvula midline. No posterior oropharyngeal erythema.   Eyes:      General: No scleral icterus.        Right eye: No discharge.         Left eye: No discharge.      Conjunctiva/sclera: Conjunctivae normal.   Neck:      Thyroid: No thyromegaly.   Cardiovascular:      Rate and Rhythm: Normal rate and regular rhythm.      Heart sounds: Normal heart sounds. No murmur heard.  Pulmonary:      Effort: Pulmonary effort is normal.      Breath sounds: Normal breath sounds.   Abdominal:      General: Bowel sounds are normal. There is no distension.      Palpations: Abdomen is soft.      Tenderness: There is no abdominal tenderness.   Musculoskeletal:         General: No deformity.      Cervical back: Neck supple.      Comments: Gait smooth and steady   Lymphadenopathy:      Cervical: No cervical adenopathy.   Skin:     General: Skin is warm and dry.   Neurological:      Mental Status: She is alert and oriented to person, place, and time. Mental status is at baseline.   Psychiatric:         Mood and Affect: Mood normal.         Behavior: Behavior normal.         Thought Content: Thought content normal.      Comments: Very pleasant, conversant        Result Review :                   Assessment and Plan   Diagnoses and all orders for this visit:    1. Routine general medical examination at a health care facility (Primary)  -     Vitamin B12  -     CBC & Differential  -     Comprehensive Metabolic Panel  -     Hemoglobin A1c  -     Lipid Panel With LDL / HDL Ratio  -     TSH Rfx On Abnormal To Free T4  -     Pain Management Profile (13 Drugs) Urine - Urine, Clean Catch    2. Multiple sclerosis (HCC)    3. Polyarthralgia    4. Recurrent major depressive disorder, in partial remission (HCC)  -     buPROPion XL (WELLBUTRIN XL) 150 MG 24 hr tablet; Take 1 tablet by mouth Every Morning.  Dispense: 90 tablet; Refill: 1    5. Overactive bladder  -     oxybutynin XL (DITROPAN XL) 15 MG 24 hr  tablet; Take 1 tablet by mouth Daily.  Dispense: 90 tablet; Refill: 1  -     oxybutynin XL (DITROPAN-XL) 5 MG 24 hr tablet; Take 1 tablet by mouth Daily.  Dispense: 90 tablet; Refill: 1    6. Chronic bilateral low back pain without sciatica  -     cyclobenzaprine (FLEXERIL) 10 MG tablet; Take 1 tablet by mouth 3 (Three) Times a Day As Needed for Muscle Spasms.  Dispense: 60 tablet; Refill: 2  -     meloxicam (MOBIC) 15 MG tablet; Take 1 tablet by mouth Daily.  Dispense: 90 tablet; Refill: 1  -     traMADol (ULTRAM) 50 MG tablet; Take 1 tablet by mouth Every 8 (Eight) Hours As Needed for Moderate Pain.  Dispense: 30 tablet; Refill: 2    Appropriate health maintenance and prevention topics specific for this patient were discussed today.  Additionally, health goals, and health concerns addressed as appropriate.  Pt was encouraged to stay up to date on recommended screenings and vaccines based on USPSTF guidelines. She is due for colonoscopy-current with Dr Maher.     Depression is stable on current Wellbutrin which we will continue.    Bilateral back pain managed with chronic NSAIDs, occasional tramadol, Flexeril as needed.  No red flags in exam or history.  She signed a controlled substance agreement at her last visit to establish care.  We will get UDS today.  Anoop reviewed and appropriate.  She is aware of cautions and side effects of all medication.    OAB controlled with current medication which we will continue.  Adequate hydration and bladder maintenance discussed.    MS seems to be stable and patient is followed at MS clinic.    I do not see previous colonoscopy in her chart.  Family history and will need to get this scheduled as she is every 3-year surveillance.         Follow Up   Return in about 3 months (around 5/1/2023) for Recheck.  Patient was given instructions and counseling regarding her condition or for health maintenance advice. Please see specific information pulled into the AVS if  appropriate.

## 2023-02-06 LAB
AMPHETAMINES UR QL SCN: NEGATIVE NG/ML
BARBITURATES UR QL SCN: NEGATIVE NG/ML
BENZODIAZ UR QL SCN: NEGATIVE NG/ML
BZE UR QL SCN: NEGATIVE NG/ML
CANNABINOIDS UR QL SCN: NEGATIVE NG/ML
CREAT UR-MCNC: 40.4 MG/DL (ref 20–300)
FENTANYL UR-MCNC: NEGATIVE PG/ML
LABORATORY COMMENT REPORT: NORMAL
MEPERIDINE UR QL: NEGATIVE NG/ML
METHADONE UR QL SCN: NEGATIVE NG/ML
OPIATES UR QL SCN: NEGATIVE NG/ML
OXYCODONE+OXYMORPHONE UR QL SCN: NEGATIVE NG/ML
PCP UR QL: NEGATIVE NG/ML
PH UR: 5.7 [PH] (ref 4.5–8.9)
PROPOXYPH UR QL SCN: NEGATIVE NG/ML
SP GR UR: 1.01
TRAMADOL UR-MCNC: NEGATIVE UG/ML

## 2023-02-27 ENCOUNTER — OFFICE VISIT (OUTPATIENT)
Dept: NEUROLOGY | Facility: CLINIC | Age: 58
End: 2023-02-27
Payer: COMMERCIAL

## 2023-02-27 VITALS
WEIGHT: 236 LBS | BODY MASS INDEX: 35.77 KG/M2 | SYSTOLIC BLOOD PRESSURE: 132 MMHG | HEIGHT: 68 IN | HEART RATE: 91 BPM | OXYGEN SATURATION: 98 % | DIASTOLIC BLOOD PRESSURE: 82 MMHG

## 2023-02-27 DIAGNOSIS — R26.89 IMPAIRMENT OF BALANCE: ICD-10-CM

## 2023-02-27 DIAGNOSIS — G35 MULTIPLE SCLEROSIS: Primary | ICD-10-CM

## 2023-02-27 PROCEDURE — 99204 OFFICE O/P NEW MOD 45 MIN: CPT | Performed by: PSYCHIATRY & NEUROLOGY

## 2023-02-27 NOTE — PROGRESS NOTES
Chief Complaint   Patient presents with   • Multiple Sclerosis       Patient ID: Akila Goodrich is a 58 y.o. female.    HPI: I had the pleasure of seeing your patient today.  As you may know she is a 58-year-old female being seen at the request of and referred to us by ADRIAN Cuellar for history of multiple sclerosis.  Patient says that she originally began experiencing symptoms back in the mid 90s.  She says that she noted numbness of her left foot.  No numbness eventually creeped up in to the left lower extremity.  She also had some loss of vision in the right eye.  She was seen by a neurologist who performed a thorough neurodiagnostic work-up including a lumbar puncture.  We do not have those reports however the patient says that the results were indicative of a demyelinating disease.  She was at that time started on Rebif.  She says that she continued with that however experienced a relapse after the birth of her child with weakness and numbness of the right arm and leg.  An MRI and follow-up did show progression of disease.  Therefore she was switched to Copaxone.  She was taking the namebrand Copaxone for several years however recently switched over to the generic.  She has continued with the generic Copaxone and has not had any significant issues.  She has had routine labs as well as follow-up MRI imaging showing no significant progression of disease.  Her most recent MRI imaging is from November 2022.  We do not have the disc.  I did visualize the MRI personally.  She does have some issues with balance chronically.  She uses a cane to assist with ambulation.  He has had a few falls over the past several months.  However no severe injury.  And typically something is in her path that she trips over.  No loss of vision.  No double vision.  No slurred speech.  No focal weakness from baseline.  No discoordination issues.    The following portions of the patient's history were reviewed and updated as  appropriate: allergies, current medications, past family history, past medical history, past social history, past surgical history and problem list.    Review of Systems   Constitutional: Negative for activity change, appetite change and fatigue.   Musculoskeletal: Positive for gait problem. Negative for back pain and neck pain.   Neurological: Negative for dizziness, tremors, seizures, syncope, facial asymmetry, speech difficulty, weakness, light-headedness, numbness and headaches.   Psychiatric/Behavioral: Positive for sleep disturbance. Negative for agitation, behavioral problems, confusion, decreased concentration, dysphoric mood, hallucinations, self-injury and suicidal ideas. The patient is not nervous/anxious and is not hyperactive.       I have reviewed the review of systems above performed by my medical assistant.      Vitals:    02/27/23 0907   BP: 132/82   Pulse: 91   SpO2: 98%       Neurologic Exam     Mental Status   Oriented to person, place, and time.   Registration: recalls 3 of 3 objects. Follows 3 step commands.   Attention: normal. Concentration: normal.   Speech: speech is normal   Level of consciousness: alert  Knowledge: consistent with education (No deficits found.).   Normal comprehension.     Cranial Nerves     CN II   Visual fields full to confrontation.     CN III, IV, VI   Pupils are equal, round, and reactive to light.  Extraocular motions are normal.   CN III: no CN III palsy  CN VI: no CN VI palsy  Nystagmus: none   Diplopia: none    CN V   Facial sensation intact.     CN VII   Facial expression full, symmetric.     CN VIII   CN VIII normal.     CN IX, X   CN IX normal.   CN X normal.     CN XI   CN XI normal.     CN XII   CN XII normal.     Motor Exam   Muscle bulk: normal  Right arm tone: normal  Left arm tone: normal  Right leg tone: normal  Left leg tone: normal    Strength   Right neck flexion: 5/5  Left neck flexion: 5/5  Right neck extension: 5/5  Left neck extension: 5/5  Right  deltoid: 5/5  Left deltoid: 5/5  Right biceps: 5/5  Left biceps: 5/5  Right triceps: 5/5  Left triceps: 5/5  Right wrist flexion: 5/5  Left wrist flexion: 5/5  Right wrist extension: 5/5  Left wrist extension: 5/5  Right interossei: 5/5  Left interossei: 5/5  Right abdominals: 5/5  Left abdominals: 5/5  Right iliopsoas: 5/5  Left iliopsoas: 5/5  Right quadriceps: 5/5  Left quadriceps: 5/5  Right hamstrin/5  Left hamstrin/5  Right glutei: 5/5  Left glutei: 5/5  Right anterior tibial: 5/5  Left anterior tibial: 5/5  Right posterior tibial: 5/5  Left posterior tibial: 5/5  Right peroneal: 5/5  Left peroneal: 5/5  Right gastroc: 5/5  Left gastroc: 5/5    Sensory Exam   Right arm light touch: decreased from elbow  Right leg light touch: decreased from knee  Vibration normal.   Proprioception normal.   Pinprick normal.     Gait, Coordination, and Reflexes     Gait  Gait: normal    Coordination   Romberg: negative    Tremor   Resting tremor: absent  Intention tremor: absent    Reflexes   Right brachioradialis: 2+  Left brachioradialis: 2+  Right biceps: 2+  Left biceps: 2+  Right triceps: 2+  Left triceps: 2+  Right patellar: 2+  Left patellar: 2+  Right achilles: 2+  Left achilles: 2+  Right : 2+  Left : 2+Station is normal.       Physical Exam  Vitals reviewed.   Constitutional:       General: She is not in acute distress.     Appearance: She is well-developed.   HENT:      Head: Normocephalic and atraumatic.   Eyes:      Extraocular Movements: EOM normal.      Pupils: Pupils are equal, round, and reactive to light.   Cardiovascular:      Rate and Rhythm: Normal rate and regular rhythm.      Heart sounds: Normal heart sounds.   Pulmonary:      Effort: Pulmonary effort is normal. No respiratory distress.      Breath sounds: Normal breath sounds.   Abdominal:      General: Bowel sounds are normal. There is no distension.      Palpations: Abdomen is soft.      Tenderness: There is no abdominal tenderness.    Musculoskeletal:         General: No deformity.      Cervical back: Normal range of motion.   Skin:     General: Skin is warm.      Findings: No rash.   Neurological:      Mental Status: She is oriented to person, place, and time.      Coordination: Romberg Test normal.      Gait: Gait is intact.      Deep Tendon Reflexes:      Reflex Scores:       Tricep reflexes are 2+ on the right side and 2+ on the left side.       Bicep reflexes are 2+ on the right side and 2+ on the left side.       Brachioradialis reflexes are 2+ on the right side and 2+ on the left side.       Patellar reflexes are 2+ on the right side and 2+ on the left side.       Achilles reflexes are 2+ on the right side and 2+ on the left side.  Psychiatric:         Speech: Speech normal.         Judgment: Judgment normal.         Procedures    Assessment/Plan: I would like to prescribe physical therapy with balance and gait training.  We will continue the Copaxone for now.  She is not due for labs at this time.  She will be likely due for follow-up MRI imaging in November of this year.  If all things are unchanged we may perform every other year MRI surveillance.  Return to clinic in 4 months or sooner if needed.       Diagnoses and all orders for this visit:    1. Multiple sclerosis (HCC) (Primary)  -     Ambulatory Referral to Physical Therapy    2. Impairment of balance  -     Ambulatory Referral to Physical Therapy           Castillo Desai II, MD

## 2023-03-10 ENCOUNTER — PATIENT ROUNDING (BHMG ONLY) (OUTPATIENT)
Dept: NEUROLOGY | Facility: CLINIC | Age: 58
End: 2023-03-10
Payer: COMMERCIAL

## 2023-04-30 NOTE — PROGRESS NOTES
GYN Annual Exam     CC- Here for annual exam   Chief Complaint   Patient presents with   • Gynecologic Exam     ANNUAL          Akila Goodrich is a 58 y.o.  female who presents for annual well woman exam. No LMP recorded. Patient is postmenopausal.    Problems in addition to need for annual: pt is BRCA 1 pos    HPI: History of Present Illness    PMHX:    * No active hospital problems. *  ; otherwise none    OB History        4    Para   3    Term   3            AB   1    Living   3       SAB   1    IAB        Ectopic        Molar        Multiple        Live Births   3                  Past Medical History:   Diagnosis Date   • Arthritis    • Depression    • Urinary tract infection        Past Surgical History:   Procedure Laterality Date   • COLONOSCOPY     • TUBAL ABDOMINAL LIGATION           Current Outpatient Medications:   •  buPROPion XL (WELLBUTRIN XL) 150 MG 24 hr tablet, Take 1 tablet by mouth Every Morning., Disp: 90 tablet, Rfl: 1  •  Calcium Citrate-Vitamin D (Citrus Calcium/Vitamin D) 200-6.25 MG-MCG tablet, Take  by mouth., Disp: , Rfl:   •  cetirizine (zyrTEC) 10 MG tablet, Take 1 tablet by mouth Daily., Disp: , Rfl:   •  cyclobenzaprine (FLEXERIL) 10 MG tablet, Take 1 tablet by mouth 3 (Three) Times a Day As Needed for Muscle Spasms., Disp: 60 tablet, Rfl: 2  •  fluticasone (FLONASE) 50 MCG/ACT nasal spray, 2 sprays into the nostril(s) as directed by provider Daily., Disp: , Rfl:   •  Glatiramer Acetate 40 MG/ML solution prefilled syringe, INJECT ONE SYRINGE SUBCUTANEOUSLY 3 TIMES A WEEK AT LEAST 48 HOURS APART. ALLOW TO WARM TO ROOM TEMP FOR 20 MINUTES. REFRIGERATE., Disp: , Rfl:   •  meloxicam (MOBIC) 15 MG tablet, Take 1 tablet by mouth Daily., Disp: 90 tablet, Rfl: 1  •  omeprazole (priLOSEC) 20 MG capsule, Take 1 capsule by mouth Daily., Disp: , Rfl:   •  oxybutynin XL (DITROPAN XL) 15 MG 24 hr tablet, Take 1 tablet by mouth Daily., Disp: 90 tablet, Rfl: 1  •  oxybutynin XL  "(DITROPAN-XL) 5 MG 24 hr tablet, Take 1 tablet by mouth Daily., Disp: 90 tablet, Rfl: 1  •  POTASSIUM PO, Take  by mouth., Disp: , Rfl:   •  traMADol (ULTRAM) 50 MG tablet, Take 1 tablet by mouth Every 8 (Eight) Hours As Needed for Moderate Pain., Disp: 30 tablet, Rfl: 2  •  vitamin C (ASCORBIC ACID) 500 MG tablet, Take 1 tablet by mouth Daily., Disp: , Rfl:     No Known Allergies    Social History     Tobacco Use   • Smoking status: Former     Packs/day: 0.50     Years: 18.00     Pack years: 9.00     Types: Cigarettes     Quit date: 2004     Years since quittin.3   Vaping Use   • Vaping Use: Never used   Substance Use Topics   • Alcohol use: Yes     Alcohol/week: 3.0 standard drinks     Types: 2 Glasses of wine, 1 Drinks containing 0.5 oz of alcohol per week     Comment: 2-3 glasses of wine/month   • Drug use: Never       Akila Goodrich  reports that she quit smoking about 19 years ago. Her smoking use included cigarettes. She has a 9.00 pack-year smoking history. She does not have any smokeless tobacco history on file..       Family History   Problem Relation Age of Onset   • Colon cancer Mother    • Cancer Mother         Colon   • Diabetes Mother    • Hyperlipidemia Mother    • Thyroid disease Mother    • Breast cancer Sister         50's   • Cancer Sister         Breast   • Breast cancer Sister         50's   • Cancer Sister         Breast   • Thyroid disease Sister    • Cancer Maternal Grandfather         Liver       Review of Systems    Patient reports that she is not currently experiencing any symptoms of urinary incontinence.      noTESTED FOR CHLAMYDIA?    EXAM:  /72   Ht 172.7 cm (68\")   Wt 107 kg (235 lb 12.8 oz)   BMI 35.85 kg/m²     Labs:   Lab Results (last 24 hours)     ** No results found for the last 24 hours. **          Physical Exam  Vitals and nursing note reviewed. Exam conducted with a chaperone present.   Constitutional:       General: She is not in acute distress.     " Appearance: She is well-developed. She is not diaphoretic.   HENT:      Head: Normocephalic and atraumatic.      Nose: Nose normal.   Eyes:      Extraocular Movements: Extraocular movements intact.   Cardiovascular:      Rate and Rhythm: Normal rate.   Pulmonary:      Effort: Pulmonary effort is normal.   Chest:   Breasts:     Breasts are symmetrical.      Right: Normal. No mass, nipple discharge, skin change or tenderness.      Left: Normal. No mass, nipple discharge, skin change or tenderness.   Abdominal:      General: There is no distension.      Palpations: Abdomen is soft. There is no mass.      Tenderness: There is no abdominal tenderness. There is no guarding.   Genitourinary:     General: Normal vulva.      Pubic Area: No rash.       Vagina: Normal. No vaginal discharge.      Cervix: Normal.      Uterus: Normal.       Adnexa: Right adnexa normal and left adnexa normal.   Musculoskeletal:         General: No tenderness or deformity. Normal range of motion.      Cervical back: Normal range of motion.   Lymphadenopathy:      Upper Body:      Right upper body: No axillary adenopathy.      Left upper body: No axillary adenopathy.   Skin:     General: Skin is warm and dry.      Coloration: Skin is not pale.      Findings: No erythema or rash.   Neurological:      Mental Status: She is alert and oriented to person, place, and time.   Psychiatric:         Behavior: Behavior normal.         Thought Content: Thought content normal.         Judgment: Judgment normal.            As part of wellness and prevention, the following topics were discussed with the patient: healthy weight, substance abuse/misuse, mental health, encouraging self breast exam, and other counseling and guidance done:  Nutrition, physical activity, healthy weight, injury prevention, misuse of tobacco, alcohol and drugs, sexual behavior and STDs, contraception, dental health, mental health, immunizations breast cancer screening and  exams.    Assessment     1) GYN annual well woman exam.   2) PAP done today? Yes  3) problems addressed: yes    MAMMOGRAM UP TO DATE IF AGE APPROPRIATE?  No  (if no, pt encouraged to schedule; risks of undiagnosed breast cancer reviewed with pt if she does not have a mammogram)    COLONOSCOPY UP TO DATE IF AGE APPROPRIATE? Yes  (if no, risks of undiagnosed colon cancer reviewed with pt if she fails to have the colonoscopy)    Fhx breast cancer? No    Fhx ovarian cancer? No    Fhx colon cancer? Yes    Invitae testing offered? No           Plan       Follow up prn or one year.    Pt instructed to call for results of any testing done today and that failure to call if she has not heard from us could result in inadequate treatment.  Pt verbalized her understanding.     Diagnoses and all orders for this visit:    1. Pap smear, low-risk (Primary)  -     IGP, Apt HPV,rfx 16 / 18,45    2. Family history of colon cancer  -     Ambulatory Referral to Breast Surgery    3. Thyroid nodule    4. Gastroesophageal reflux disease, unspecified whether esophagitis present    5. Overactive bladder    6. Family history of BRCA gene positive  -     Ambulatory Referral to Breast Surgery    7. BRCA1 gene mutation positive  -     Ambulatory Referral to Breast Surgery    8. Recurrent major depressive disorder, in partial remission    9. Polyarthralgia    10. Multiple sclerosis    11. Psychophysiological insomnia    12. Screening mammogram for breast cancer  -     Mammo Screening Digital Tomosynthesis Bilateral With CAD; Future  -     Mammo Screening Digital Tomosynthesis Bilateral With CAD; Future    13. Routine gynecological examination  -     IGP, Apt HPV,rfx 16 / 18,45    14. Screening for human papillomavirus  -     IGP, Apt HPV,rfx 16 / 18,45        RTO Return in about 1 year (around 5/1/2024) for Annual physical.        Jd Worley MD  05/01/23  11:01 EDT

## 2023-05-01 ENCOUNTER — TELEPHONE (OUTPATIENT)
Dept: SURGERY | Facility: CLINIC | Age: 58
End: 2023-05-01
Payer: COMMERCIAL

## 2023-05-01 ENCOUNTER — PATIENT ROUNDING (BHMG ONLY) (OUTPATIENT)
Dept: OBSTETRICS AND GYNECOLOGY | Facility: CLINIC | Age: 58
End: 2023-05-01
Payer: COMMERCIAL

## 2023-05-01 ENCOUNTER — OFFICE VISIT (OUTPATIENT)
Dept: OBSTETRICS AND GYNECOLOGY | Facility: CLINIC | Age: 58
End: 2023-05-01
Payer: COMMERCIAL

## 2023-05-01 VITALS
HEIGHT: 68 IN | SYSTOLIC BLOOD PRESSURE: 118 MMHG | DIASTOLIC BLOOD PRESSURE: 72 MMHG | BODY MASS INDEX: 35.74 KG/M2 | WEIGHT: 235.8 LBS

## 2023-05-01 DIAGNOSIS — Z12.31 SCREENING MAMMOGRAM FOR BREAST CANCER: ICD-10-CM

## 2023-05-01 DIAGNOSIS — K21.9 GASTROESOPHAGEAL REFLUX DISEASE, UNSPECIFIED WHETHER ESOPHAGITIS PRESENT: ICD-10-CM

## 2023-05-01 DIAGNOSIS — N32.81 OVERACTIVE BLADDER: ICD-10-CM

## 2023-05-01 DIAGNOSIS — Z11.51 SCREENING FOR HUMAN PAPILLOMAVIRUS: ICD-10-CM

## 2023-05-01 DIAGNOSIS — M25.50 POLYARTHRALGIA: ICD-10-CM

## 2023-05-01 DIAGNOSIS — F51.04 PSYCHOPHYSIOLOGICAL INSOMNIA: ICD-10-CM

## 2023-05-01 DIAGNOSIS — G35 MULTIPLE SCLEROSIS: ICD-10-CM

## 2023-05-01 DIAGNOSIS — Z01.419 ROUTINE GYNECOLOGICAL EXAMINATION: ICD-10-CM

## 2023-05-01 DIAGNOSIS — Z15.01 BRCA1 GENE MUTATION POSITIVE: ICD-10-CM

## 2023-05-01 DIAGNOSIS — F33.41 RECURRENT MAJOR DEPRESSIVE DISORDER, IN PARTIAL REMISSION: ICD-10-CM

## 2023-05-01 DIAGNOSIS — Z84.81 FAMILY HISTORY OF BRCA GENE POSITIVE: ICD-10-CM

## 2023-05-01 DIAGNOSIS — Z15.09 BRCA1 GENE MUTATION POSITIVE: ICD-10-CM

## 2023-05-01 DIAGNOSIS — Z80.0 FAMILY HISTORY OF COLON CANCER: ICD-10-CM

## 2023-05-01 DIAGNOSIS — Z01.419 PAP SMEAR, LOW-RISK: Primary | ICD-10-CM

## 2023-05-01 DIAGNOSIS — E04.1 THYROID NODULE: ICD-10-CM

## 2023-05-01 RX ORDER — ASCORBIC ACID 500 MG
500 TABLET ORAL DAILY
COMMUNITY

## 2023-05-01 NOTE — PROGRESS NOTES
A MY-CHART MESSAGE HAS BEEN SENT TO THE PATIENT FOR PATIENT ROUNDING WITH [facility] [place]

## 2023-05-03 DIAGNOSIS — Z15.01 BRCA1 GENE MUTATION POSITIVE: Primary | ICD-10-CM

## 2023-05-03 DIAGNOSIS — Z15.09 BRCA1 GENE MUTATION POSITIVE: Primary | ICD-10-CM

## 2023-05-05 LAB
CYTOLOGIST CVX/VAG CYTO: NORMAL
CYTOLOGY CVX/VAG DOC CYTO: NORMAL
CYTOLOGY CVX/VAG DOC THIN PREP: NORMAL
DX ICD CODE: NORMAL
HIV 1 & 2 AB SER-IMP: NORMAL
HPV I/H RISK 4 DNA CVX QL PROBE+SIG AMP: NEGATIVE
OTHER STN SPEC: NORMAL
STAT OF ADQ CVX/VAG CYTO-IMP: NORMAL

## 2023-06-15 DIAGNOSIS — G89.29 CHRONIC BILATERAL LOW BACK PAIN WITHOUT SCIATICA: ICD-10-CM

## 2023-06-15 DIAGNOSIS — M54.50 CHRONIC BILATERAL LOW BACK PAIN WITHOUT SCIATICA: ICD-10-CM

## 2023-06-16 ENCOUNTER — HOSPITAL ENCOUNTER (OUTPATIENT)
Dept: MAMMOGRAPHY | Facility: HOSPITAL | Age: 58
Discharge: HOME | End: 2023-06-16
Admitting: OBSTETRICS & GYNECOLOGY
Payer: COMMERCIAL

## 2023-06-16 DIAGNOSIS — Z12.31 SCREENING MAMMOGRAM FOR BREAST CANCER: ICD-10-CM

## 2023-06-16 PROCEDURE — 77067 SCR MAMMO BI INCL CAD: CPT

## 2023-06-16 PROCEDURE — 77063 BREAST TOMOSYNTHESIS BI: CPT

## 2023-06-16 RX ORDER — TRAMADOL HYDROCHLORIDE 50 MG/1
TABLET ORAL
Qty: 30 TABLET | Refills: 0 | Status: SHIPPED | OUTPATIENT
Start: 2023-06-16

## 2023-06-16 NOTE — TELEPHONE ENCOUNTER
Rx Refill Note  Requested Prescriptions     Pending Prescriptions Disp Refills    traMADol (ULTRAM) 50 MG tablet [Pharmacy Med Name: traMADol HCl 50 MG Oral Tablet] 30 tablet 0     Sig: TAKE 1 TABLET BY MOUTH EVERY 8 HOURS AS NEEDED FOR MODERATE PAIN      Last office visit with prescribing clinician: 2/1/2023   Last telemedicine visit with prescribing clinician: Visit date not found   Next office visit with prescribing clinician: Visit date not found                         Would you like a call back once the refill request has been completed: [] Yes [] No    If the office needs to give you a call back, can they leave a voicemail: [] Yes [] No    Lashonda Pedro MA  06/16/23, 12:23 EDT

## 2023-08-09 ENCOUNTER — TELEPHONE (OUTPATIENT)
Dept: NEUROLOGY | Facility: CLINIC | Age: 58
End: 2023-08-09

## 2023-08-09 NOTE — TELEPHONE ENCOUNTER
"  Caller: Akila Goodrich \"Li\"    Relationship to patient: Self    Best call back number: [phone]    Chief complaint: MS    Type of visit: FOLLOW UP    Requested date: ASAP     If rescheduling, when is the original appointment: 1/9/24     Additional notes: PATIENT STATES HER HANDS HAVE BEEN FALLING ASLEEP EVERYDAY FOR ABOUT A WEEK. SHE STATES THAT HER RIGHT HAND IS ALSO TINGLY, HER LEFT FINGERS GET NUMB, AND THEY HURT HER. SHE STATES THAT OCCASIONALLY THIS HAPPENS WITH HER FEET AS WELL.     PATIENT STATES SHE WOULD BE OKAY WITH SEEING AN APRN IF THEY COULD SEE HER SOONER AS WELL.    PLEASE ADVISE, THANK YOU.    "

## 2023-08-14 NOTE — TELEPHONE ENCOUNTER
Called number, it says phone number disconnected. We will need to get pt scheudled for sooner follow up per Dr. Desai.

## 2023-08-28 ENCOUNTER — TELEPHONE (OUTPATIENT)
Dept: NEUROLOGY | Facility: CLINIC | Age: 58
End: 2023-08-28
Payer: COMMERCIAL

## 2023-08-28 NOTE — TELEPHONE ENCOUNTER
spoke with pt and got her fu appt scheduled for friday september 8th at 1pm, pt has continued numbness in right arm and hand.

## 2023-09-08 ENCOUNTER — OFFICE VISIT (OUTPATIENT)
Dept: NEUROLOGY | Facility: CLINIC | Age: 58
End: 2023-09-08
Payer: COMMERCIAL

## 2023-09-08 ENCOUNTER — TELEPHONE (OUTPATIENT)
Dept: NEUROLOGY | Facility: CLINIC | Age: 58
End: 2023-09-08

## 2023-09-08 VITALS
SYSTOLIC BLOOD PRESSURE: 128 MMHG | DIASTOLIC BLOOD PRESSURE: 80 MMHG | OXYGEN SATURATION: 98 % | WEIGHT: 235 LBS | HEART RATE: 80 BPM | BODY MASS INDEX: 35.73 KG/M2

## 2023-09-08 DIAGNOSIS — E53.8 LOW SERUM VITAMIN B12: Primary | ICD-10-CM

## 2023-09-08 DIAGNOSIS — R20.2 PARESTHESIAS: ICD-10-CM

## 2023-09-08 DIAGNOSIS — G35 MULTIPLE SCLEROSIS: ICD-10-CM

## 2023-09-08 RX ORDER — ERGOCALCIFEROL (VITAMIN D2) 10 MCG
400 TABLET ORAL DAILY
COMMUNITY

## 2023-09-08 RX ORDER — CYANOCOBALAMIN 1000 UG/ML
1000 INJECTION, SOLUTION INTRAMUSCULAR; SUBCUTANEOUS ONCE
Status: COMPLETED | OUTPATIENT
Start: 2023-09-08 | End: 2023-09-08

## 2023-09-08 RX ORDER — CYANOCOBALAMIN 1000 UG/ML
1000 INJECTION, SOLUTION INTRAMUSCULAR; SUBCUTANEOUS DAILY
Qty: 12 ML | Refills: 0 | Status: SHIPPED | OUTPATIENT
Start: 2023-09-08

## 2023-09-08 RX ORDER — OXYBUTYNIN CHLORIDE 5 MG/1
5 TABLET, EXTENDED RELEASE ORAL DAILY
COMMUNITY

## 2023-09-08 RX ADMIN — CYANOCOBALAMIN 1000 MCG: 1000 INJECTION, SOLUTION INTRAMUSCULAR; SUBCUTANEOUS at 14:25

## 2023-09-08 NOTE — PROGRESS NOTES
Chief Complaint   Patient presents with    Multiple Sclerosis       Patient ID: Akila Goodrich is a 58 y.o. female.    HPI: I have had the pleasure of seeing your patient today.  As you may know she is a 58-year-old female here for the management of multiple sclerosis.  She has been doing well from an MS standpoint.  She is taking generic for Copaxone.  She tolerates it quite well.  She has been having some paresthesias in her hands bilaterally.  She also has had some paresthesias of her feet.  She denies any trouble swallowing.  No double vision.  No significant balance changes from baseline.  She does use a cane to assist with ambulation.  In reviewing her chart she has had a vitamin B12 level 346.  She was told to take oral supplementation.  She continues to have the paresthesias however.  She is scheduled for follow-up MRI imaging in January.  No recent falls.  She does have some chronic weakness of the right upper and lower extremity.    The following portions of the patient's history were reviewed and updated as appropriate: allergies, current medications, past family history, past medical history, past social history, past surgical history and problem list.    Review of Systems   Neurological:  Positive for weakness (bi lat legs and hands) and numbness (right hand/leg). Negative for dizziness, tremors, seizures, syncope, facial asymmetry, speech difficulty, light-headedness and headaches.   Psychiatric/Behavioral:  Positive for confusion and sleep disturbance (due to hand pain). Negative for agitation, behavioral problems, decreased concentration, dysphoric mood, hallucinations, self-injury and suicidal ideas. The patient is not nervous/anxious and is not hyperactive.     I have reviewed the review of systems above performed by my medical assistant.      Vitals:    09/08/23 1308   BP: 128/80   Pulse: 80   SpO2: 98%       Neurologic Exam     Mental Status   Oriented to person, place, and time.   Concentration:  normal.   Level of consciousness: alert  Knowledge: consistent with education (No deficits found.).     Cranial Nerves     CN II   Visual fields full to confrontation.     CN III, IV, VI   Pupils are equal, round, and reactive to light.  Extraocular motions are normal.   CN III: no CN III palsy  CN VI: no CN VI palsy    CN V   Facial sensation intact.     CN VII   Facial expression full, symmetric.     CN VIII   CN VIII normal.     CN IX, X   CN IX normal.   CN X normal.     CN XI   CN XI normal.     CN XII   CN XII normal.     Motor Exam     Strength   Right neck flexion: 5/5  Left neck flexion: 5/5  Right neck extension: 5/5  Left neck extension: 5/5  Right deltoid: 5/5  Left deltoid: 5/5  Right biceps: 5/5  Left biceps: 5/5  Right triceps: 5/5  Left triceps: 5/5  Right wrist flexion: 5/5  Left wrist flexion: 5/5  Right wrist extension: 5/5  Left wrist extension: 5/5  Right interossei: 5/5  Left interossei: 5/5  Right abdominals: 5/5  Left abdominals: 5/5  Right iliopsoas: 5/5  Left iliopsoas: 5/5  Right quadriceps: 5/5  Left quadriceps: 5/5  Right hamstrin/5  Left hamstrin/5  Right glutei: 5/5  Left glutei: 5/5  Right anterior tibial: 5/5  Left anterior tibial: 5/5  Right posterior tibial: 5/5  Left posterior tibial: 5/5  Right peroneal: 5/5  Left peroneal: 5/5  Right gastroc: 5/5  Left gastroc: 5/5    Sensory Exam   Light touch normal.   Vibration normal.     Gait, Coordination, and Reflexes     Gait  Gait: normal    Reflexes   Right brachioradialis: 2+  Left brachioradialis: 2+  Right biceps: 2+  Left biceps: 2+  Right triceps: 2+  Left triceps: 2+  Right patellar: 2+  Left patellar: 2+  Right achilles: 2+  Left achilles: 2+  Right : 2+  Left : 2+Station is normal.     Physical Exam  Vitals reviewed.   Constitutional:       Appearance: She is well-developed.   HENT:      Head: Normocephalic and atraumatic.   Eyes:      Extraocular Movements: EOM normal.      Pupils: Pupils are equal, round, and  reactive to light.   Cardiovascular:      Rate and Rhythm: Normal rate and regular rhythm.   Pulmonary:      Breath sounds: Normal breath sounds.   Musculoskeletal:         General: Normal range of motion.   Skin:     General: Skin is warm.   Neurological:      Mental Status: She is oriented to person, place, and time.      Gait: Gait is intact.      Deep Tendon Reflexes:      Reflex Scores:       Tricep reflexes are 2+ on the right side and 2+ on the left side.       Bicep reflexes are 2+ on the right side and 2+ on the left side.       Brachioradialis reflexes are 2+ on the right side and 2+ on the left side.       Patellar reflexes are 2+ on the right side and 2+ on the left side.       Achilles reflexes are 2+ on the right side and 2+ on the left side.      Procedures    Assessment/Plan: We will continue with the scheduled MRI imaging for January.  She will continue with the Copaxone for now.  She has had recent LFTs.  No need to repeat those today.  We will start her on vitamin B12 injections.  The protocol will be sent to the pharmacy.  We will see her back in January after MRI brain and cervical spine.  A total of 30 minutes was spent face-to-face with the patient today.  Of that greater than 50% of this time was spent discussing signs and symptoms of multiple sclerosis, B12 deficiency, patient education, plan of care and prognosis.         Diagnoses and all orders for this visit:    1. Low serum vitamin B12 (Primary)  -     cyanocobalamin 1000 MCG/ML injection; Inject 1 mL into the appropriate muscle as directed by prescriber Daily. 1 mL q week x 4 weeks, 1 mL qow x 4 mos  Dispense: 12 mL; Refill: 0    2. Multiple sclerosis    3. Paresthesias  -     cyanocobalamin 1000 MCG/ML injection; Inject 1 mL into the appropriate muscle as directed by prescriber Daily. 1 mL q week x 4 weeks, 1 mL qow x 4 mos  Dispense: 12 mL; Refill: 0           Castillo Desai II, MD

## 2023-09-08 NOTE — TELEPHONE ENCOUNTER
Pt brought in disability forms.  I placed them in Dr Dean folder.  Pt would like a copy mailed to her upon completion

## 2023-10-11 ENCOUNTER — TELEPHONE (OUTPATIENT)
Dept: NEUROLOGY | Facility: CLINIC | Age: 58
End: 2023-10-11

## 2023-10-11 NOTE — TELEPHONE ENCOUNTER
"  Caller: Akila Goodrich \"Li\"    Relationship: Self    Best call back number: [phone]     What form or medical record are you requesting:   [employer] DIS[employer] FORM (MEDICAL SOURCE STATEMENT)    Who is requesting this form or medical record from you:   [employer]    How would you like to receive the form or medical records (pick-up, mail, fax): FAX AND MAIL  If fax, what is the fax number: [phone]  If mail, what is the address: PT'S HOME ADDRESS  If pick-up, provide patient with address and location details    Timeframe paperwork needed: BY THE END OF THE MONTH. IT WAS DROPPED OFF AT THE OFFICE 9-8-23    Additional notes:   PT HAS ASKED THE COMPLETED FORMS BE FAX TO HER  AT THE NUMBER LISTED AND TO MAIL HER A COPY AS WELL.  "

## 2023-10-23 ENCOUNTER — TELEPHONE (OUTPATIENT)
Dept: NEUROLOGY | Facility: CLINIC | Age: 58
End: 2023-10-23
Payer: COMMERCIAL

## 2023-10-23 RX ORDER — GLATIRAMER 40 MG/ML
INJECTION, SOLUTION SUBCUTANEOUS
Qty: 11.76 ML | Refills: 3 | Status: SHIPPED | OUTPATIENT
Start: 2023-10-23

## 2023-10-23 NOTE — TELEPHONE ENCOUNTER
PATIENT CALLING TODAY TO CHECK STATUS OF WORK FORCE PAPERWORK    PLEASE ADVISE PATIENT    THANK YOU

## 2023-10-23 NOTE — TELEPHONE ENCOUNTER
Medication requested (name and dose):      GLATIRAMER ACETATE 40 MG/ML SOLUTION PREFILLED SYRINGE  INJECT ONE SYRINGE SUBCUTANEOUSLY 3 TIMES A WEEK AT LEAST 48 HOURS APART. ALLOW TO WARM TO ROOM TEMP FOR 20 MINUTES. REFRIGERATE.       Pharmacy where request should be sent:      [facility] - [address] [facility] - [address] AT Portal to Registered [facility] Sites - [phone]  - [phone]       Additional details provided by patient:      Best call back number:  [phone]    Does the patient have less than a 3 day supply:  [x] Yes  [] No    Rachel Cabrera Rep  10/23/23, 08:39 EDT [id number]}

## 2023-11-02 ENCOUNTER — OFFICE VISIT (OUTPATIENT)
Dept: FAMILY MEDICINE CLINIC | Facility: CLINIC | Age: 58
End: 2023-11-02
Payer: COMMERCIAL

## 2023-11-02 VITALS
HEART RATE: 72 BPM | BODY MASS INDEX: 34.83 KG/M2 | WEIGHT: 229.8 LBS | SYSTOLIC BLOOD PRESSURE: 120 MMHG | DIASTOLIC BLOOD PRESSURE: 82 MMHG | HEIGHT: 68 IN | OXYGEN SATURATION: 99 %

## 2023-11-02 DIAGNOSIS — J01.10 ACUTE NON-RECURRENT FRONTAL SINUSITIS: Primary | ICD-10-CM

## 2023-11-02 DIAGNOSIS — M62.838 MUSCLE SPASM: ICD-10-CM

## 2023-11-02 PROCEDURE — 99213 OFFICE O/P EST LOW 20 MIN: CPT | Performed by: NURSE PRACTITIONER

## 2023-11-02 RX ORDER — AMOXICILLIN 500 MG/1
500 CAPSULE ORAL 2 TIMES DAILY
Qty: 20 CAPSULE | Refills: 0 | Status: SHIPPED | OUTPATIENT
Start: 2023-11-02 | End: 2023-11-12

## 2023-11-02 RX ORDER — METHYLPREDNISOLONE 4 MG/1
TABLET ORAL
Qty: 1 EACH | Refills: 0 | Status: SHIPPED | OUTPATIENT
Start: 2023-11-02

## 2023-11-02 NOTE — PROGRESS NOTES
"Chief Complaint  Follow-up (Med check )    Subjective        Akila Goodrich presents to [facility]  History of Present Illness patient is here with spouse for follow-up on medication management.  She has been taking and tolerating tramadol for left shoulder pain without any improvement.  Sometimes even takes 2 without even touching her pain.  Pain is most noticeable at night when sitting down watching TV.  Does not hurt when laying down or other ADLs.  Pain is located in her upper back and trapezius.  She has no shoulder injuries known and has full range of motion with her shoulder.    She also has sinusitis that has been worsening over the last week.  She had a couple days of voice lost-came back after 2 days.  Ha significant fatigue.  Had achiness which is now improved.  No ear or throat pain.  No cough or dyspnea, no belly symptoms.  Did not test for COVID.  Has been taking Sudafed and other OTC medications.  Spouse was recently seen by urgent care for same and given antibiotics and Medrol Dosepak which patient would like to have as well.  She is on immunosuppressives secondary to MS. denies fever or chills.      Objective   Vital Signs:  /82 (BP Location: Left arm, Patient Position: Sitting, Cuff Size: Adult)   Pulse 72   Ht 172.7 cm (68\")   Wt 104 kg (229 lb 12.8 oz)   SpO2 99%   BMI 34.94 kg/m²   Estimated body mass index is 34.94 kg/m² as calculated from the following:    Height as of this encounter: 172.7 cm (68\").    Weight as of this encounter: 104 kg (229 lb 12.8 oz).               Physical Exam  Vitals and nursing note reviewed.   Constitutional:       General: She is not in acute distress.     Appearance: She is well-developed. She is not ill-appearing or diaphoretic.   HENT:      Head: Normocephalic and atraumatic.      Right Ear: Tympanic membrane, ear canal and external ear normal.      Left Ear: Tympanic membrane, ear canal and external ear normal.      " Mouth/Throat:      Mouth: Mucous membranes are moist.      Pharynx: Posterior oropharyngeal erythema present. No oropharyngeal exudate.   Eyes:      General: No scleral icterus.        Right eye: No discharge.         Left eye: No discharge.      Conjunctiva/sclera: Conjunctivae normal.   Cardiovascular:      Rate and Rhythm: Normal rate and regular rhythm.   Pulmonary:      Effort: Pulmonary effort is normal.      Breath sounds: Normal breath sounds. No wheezing.   Abdominal:      General: Bowel sounds are normal.      Palpations: Abdomen is soft.   Musculoskeletal:         General: No deformity.      Left shoulder: No swelling, deformity, bony tenderness or crepitus. Normal range of motion. Normal strength.      Cervical back: Neck supple.      Comments: Bilateral trapezius tenderness to palpation, left greater than right   Lymphadenopathy:      Cervical: No cervical adenopathy.   Skin:     General: Skin is warm and dry.   Neurological:      Mental Status: She is alert and oriented to person, place, and time.        Result Review :                   Assessment and Plan   Diagnoses and all orders for this visit:    1. Acute non-recurrent frontal sinusitis (Primary)  -     amoxicillin (AMOXIL) 500 MG capsule; Take 1 capsule by mouth 2 (Two) Times a Day for 10 days.  Dispense: 20 capsule; Refill: 0  -     methylPREDNISolone (MEDROL) 4 MG dose pack; Take as directed on package instructions.  Dispense: 1 each; Refill: 0    2. Muscle spasm    Amoxicillin for sinusitis with Medrol Dosepak as requested.  If not improving will need follow-up.  Side effects of medications discussed.  Discussed importance of management of allergies prior to allergy season, especially since she seems to get sick this time of year.    Muscle spasms: Patient was concerned that it may be rotator cuff problem with left shoulder but her shoulder exam is normal and pain seems to be coming from muscle spasms in trapezius particularly on the left  side.  Discussed management.  She declines PT.  She has cyclobenzaprine at home and will try that as well as YouTube videos for physical therapy to help stretching.  If not improving will let me know.  She is aware of side effects of cyclobenzaprine.  Will DC tramadol since it is not at all helpful.         Follow Up   Return if symptoms worsen or fail to improve.  Patient was given instructions and counseling regarding her condition or for health maintenance advice. Please see specific information pulled into the AVS if appropriate.         Answers submitted by the patient for this visit:  Other (Submitted on 11/1/2023)  Please describe your symptoms.: Cough, headache,  Have you had these symptoms before?: Yes  How long have you been having these symptoms?: 5-7 days  Primary Reason for Visit (Submitted on 11/1/2023)  What is the primary reason for your visit?: Other

## 2023-12-08 ENCOUNTER — TELEPHONE (OUTPATIENT)
Dept: NEUROLOGY | Facility: CLINIC | Age: 58
End: 2023-12-08
Payer: COMMERCIAL

## 2023-12-08 NOTE — TELEPHONE ENCOUNTER
"----- Message from Sania Fiore MA sent at 12/4/2023  2:34 PM EST -----  Regarding: FW: MRI  Contact: [phone]  Let me know if new orders are needed? I see orders pending from June. Thank you.   ----- Message -----  From: Akila Goodrich \"Li\"  Sent: 12/4/2023  12:23 PM EST  To: [facility]  Subject: MRI                                              Good morning, Sania,    I was wondering if you could send a referral to [facility] on [facility].     Thank you,  Li    "

## 2024-01-09 ENCOUNTER — OFFICE VISIT (OUTPATIENT)
Dept: NEUROLOGY | Facility: CLINIC | Age: 59
End: 2024-01-09
Payer: COMMERCIAL

## 2024-01-09 VITALS
DIASTOLIC BLOOD PRESSURE: 72 MMHG | WEIGHT: 231 LBS | BODY MASS INDEX: 35.01 KG/M2 | SYSTOLIC BLOOD PRESSURE: 110 MMHG | OXYGEN SATURATION: 99 % | HEIGHT: 68 IN | HEART RATE: 64 BPM

## 2024-01-09 DIAGNOSIS — G56.03 BILATERAL CARPAL TUNNEL SYNDROME: Primary | ICD-10-CM

## 2024-01-09 DIAGNOSIS — E53.8 LOW SERUM VITAMIN B12: ICD-10-CM

## 2024-01-09 DIAGNOSIS — R26.89 IMPAIRMENT OF BALANCE: ICD-10-CM

## 2024-01-09 DIAGNOSIS — G35 MULTIPLE SCLEROSIS: ICD-10-CM

## 2024-01-09 PROCEDURE — 99214 OFFICE O/P EST MOD 30 MIN: CPT | Performed by: PSYCHIATRY & NEUROLOGY

## 2024-01-09 NOTE — PROGRESS NOTES
Chief complaint: Multiple sclerosis      Patient ID: Akila Goodrich is a 58 y.o. female.    HPI: I had the pleasure of seeing your patient today.  As you may know she is a 58-year-old female here for the management of MS.  She currently takes Copaxone injections and has tolerated them well.  She does use the generic.  She denies any new symptoms aside from chronic numbness and tingling in both hands.  This typically occurs when she is asleep.  She will awaken with the symptoms.  She will typically have to shake her hands which will resolve the symptoms.  I did review her most recent MRI images of the brain, cervical and thoracic spine.  No new lesions were noted.  Multiple T2 flair hyperintensities were seen however.  They do appear chronic.  She denies any double vision or loss of vision.  No slurred speech or trouble getting words out.  No new onset focal weakness of her arms or legs.  She still does use a cane for assistance with ambulation.    The following portions of the patient's history were reviewed and updated as appropriate: allergies, current medications, past family history, past medical history, past social history, past surgical history and problem list.    Review of Systems   Constitutional:  Positive for fatigue.   HENT:  Negative for ear pain and tinnitus.    Eyes:  Negative for photophobia, pain and visual disturbance.   Respiratory:  Negative for chest tightness, shortness of breath, wheezing and stridor.    Cardiovascular:  Negative for chest pain and palpitations.   Musculoskeletal:  Positive for gait problem (cane, 1 fall since LOV).   Neurological:  Positive for weakness (legs) and numbness (legs).   Psychiatric/Behavioral:  Negative for confusion and decreased concentration.       I have reviewed the review of systems above performed by my medical assistant.      Vitals:    01/09/24 1615   BP: 110/72   Pulse: 64   SpO2: 99%       Neurologic Exam     Mental Status   Oriented to person, place,  and time.   Concentration: normal.   Level of consciousness: alert  Knowledge: consistent with education (No deficits found.).     Cranial Nerves     CN II   Visual fields full to confrontation.     CN III, IV, VI   Pupils are equal, round, and reactive to light.  Extraocular motions are normal.   CN III: no CN III palsy  CN VI: no CN VI palsy    CN V   Facial sensation intact.     CN VII   Facial expression full, symmetric.     CN VIII   CN VIII normal.     CN IX, X   CN IX normal.   CN X normal.     CN XI   CN XI normal.     CN XII   CN XII normal.     Motor Exam     Strength   Right neck flexion: 5/5  Left neck flexion: 5/5  Right neck extension: 5/5  Left neck extension: 5/5  Right deltoid: 5/5  Left deltoid: 5/5  Right biceps: 5/5  Left biceps: 5/5  Right triceps: 5/5  Left triceps: 5/5  Right wrist flexion: 5/5  Left wrist flexion: 5/5  Right wrist extension: 5/5  Left wrist extension: 5/5  Right interossei: 5/5  Left interossei: 5/5  Right abdominals: 5/5  Left abdominals: 5/5  Right iliopsoas: 5/5  Left iliopsoas: 5/5  Right quadriceps: 5/5  Left quadriceps: 5/5  Right hamstrin/5  Left hamstrin/5  Right glutei: 5/5  Left glutei: 5/5  Right anterior tibial: 5/5  Left anterior tibial: 5/5  Right posterior tibial: 5/5  Left posterior tibial: 5/5  Right peroneal: 5/5  Left peroneal: 5/5  Right gastroc: 5/5  Left gastroc: 5/5    Sensory Exam   Right leg light touch: decreased from ankle  Vibration normal.     Gait, Coordination, and Reflexes     Gait  Gait: normal    Reflexes   Right brachioradialis: 2+  Left brachioradialis: 2+  Right biceps: 2+  Left biceps: 2+  Right triceps: 2+  Left triceps: 2+  Right patellar: 2+  Left patellar: 2+  Right achilles: 2+  Left achilles: 2+  Right : 2+  Left : 2+Station is normal.       Physical Exam  Vitals reviewed.   Constitutional:       Appearance: She is well-developed.   HENT:      Head: Normocephalic and atraumatic.   Eyes:      Extraocular Movements:  EOM normal.      Pupils: Pupils are equal, round, and reactive to light.   Cardiovascular:      Rate and Rhythm: Normal rate and regular rhythm.   Pulmonary:      Breath sounds: Normal breath sounds.   Musculoskeletal:         General: Normal range of motion.   Skin:     General: Skin is warm.   Neurological:      Mental Status: She is oriented to person, place, and time.      Gait: Gait is intact.      Deep Tendon Reflexes:      Reflex Scores:       Tricep reflexes are 2+ on the right side and 2+ on the left side.       Bicep reflexes are 2+ on the right side and 2+ on the left side.       Brachioradialis reflexes are 2+ on the right side and 2+ on the left side.       Patellar reflexes are 2+ on the right side and 2+ on the left side.       Achilles reflexes are 2+ on the right side and 2+ on the left side.        Procedures    Assessment/Plan: We are going to refer her for physical therapy.  We will also prescribe wrist splints for both wrists.  Continue Copaxone as scheduled.  Will see her back in 6 months or sooner if needed.  A total of 30 minutes was spent face-to-face with the patient today.  Of that greater than 50% of this time was spent discussing signs and symptoms of multiple sclerosis, carpal tunnel syndrome, patient education, plan of care and prognosis.         Diagnoses and all orders for this visit:    1. Bilateral carpal tunnel syndrome (Primary)  -      Wrist Hand Orthosis, Wrist Extension Control Cock-up    2. Multiple sclerosis  -     Ambulatory Referral to Physical Therapy    3. Low serum vitamin B12    4. Impairment of balance  -     Ambulatory Referral to Physical Therapy           Castillo Desai II, MD

## 2024-01-09 NOTE — LETTER
January 9, 2024       No Recipients    Patient: Akila Goodrich   YOB: 1965   Date of Visit: 1/9/2024     Dear ADRIAN Nina:       Thank you for referring Akila Goodrich to me for evaluation. Below are the relevant portions of my assessment and plan of care.    If you have questions, please do not hesitate to call me. I look forward to following Akila along with you.         Sincerely,        Castillo Desai II, MD        CC:   No Recipients    Castillo Desai II, MD  01/09/24 1707  Sign when Signing Visit  Chief complaint: Multiple sclerosis      Patient ID: Akila Goodrich is a 58 y.o. female.    HPI: I had the pleasure of seeing your patient today.  As you may know she is a 58-year-old female here for the management of MS.  She currently takes Copaxone injections and has tolerated them well.  She does use the generic.  She denies any new symptoms aside from chronic numbness and tingling in both hands.  This typically occurs when she is asleep.  She will awaken with the symptoms.  She will typically have to shake her hands which will resolve the symptoms.  I did review her most recent MRI images of the brain, cervical and thoracic spine.  No new lesions were noted.  Multiple T2 flair hyperintensities were seen however.  They do appear chronic.  She denies any double vision or loss of vision.  No slurred speech or trouble getting words out.  No new onset focal weakness of her arms or legs.  She still does use a cane for assistance with ambulation.    The following portions of the patient's history were reviewed and updated as appropriate: allergies, current medications, past family history, past medical history, past social history, past surgical history and problem list.    Review of Systems   Constitutional:  Positive for fatigue.   HENT:  Negative for ear pain and tinnitus.    Eyes:  Negative for photophobia, pain and visual disturbance.   Respiratory:  Negative for chest tightness, shortness  of breath, wheezing and stridor.    Cardiovascular:  Negative for chest pain and palpitations.   Musculoskeletal:  Positive for gait problem (cane, 1 fall since LOV).   Neurological:  Positive for weakness (legs) and numbness (legs).   Psychiatric/Behavioral:  Negative for confusion and decreased concentration.       I have reviewed the review of systems above performed by my medical assistant.      Vitals:    24 1615   BP: 110/72   Pulse: 64   SpO2: 99%       Neurologic Exam     Mental Status   Oriented to person, place, and time.   Concentration: normal.   Level of consciousness: alert  Knowledge: consistent with education (No deficits found.).     Cranial Nerves     CN II   Visual fields full to confrontation.     CN III, IV, VI   Pupils are equal, round, and reactive to light.  Extraocular motions are normal.   CN III: no CN III palsy  CN VI: no CN VI palsy    CN V   Facial sensation intact.     CN VII   Facial expression full, symmetric.     CN VIII   CN VIII normal.     CN IX, X   CN IX normal.   CN X normal.     CN XI   CN XI normal.     CN XII   CN XII normal.     Motor Exam     Strength   Right neck flexion: 5/5  Left neck flexion: 5/5  Right neck extension: 5/5  Left neck extension: 5/5  Right deltoid: 5/5  Left deltoid: 5/5  Right biceps: 5/5  Left biceps: 5/5  Right triceps: 5/5  Left triceps: 5/5  Right wrist flexion: 5/5  Left wrist flexion: 5/5  Right wrist extension: 5/5  Left wrist extension: 5/5  Right interossei: 5/5  Left interossei: 5/5  Right abdominals: 5/5  Left abdominals: 5/5  Right iliopsoas: 5/5  Left iliopsoas: 5/5  Right quadriceps: 5/5  Left quadriceps: 5/5  Right hamstrin/5  Left hamstrin/5  Right glutei: 5/5  Left glutei: 5/5  Right anterior tibial: 5/5  Left anterior tibial: 5/5  Right posterior tibial: 5/5  Left posterior tibial: 5/5  Right peroneal: 5/5  Left peroneal: 5/5  Right gastroc: 5/5  Left gastroc: 5/5    Sensory Exam   Right leg light touch: decreased from  ankle  Vibration normal.     Gait, Coordination, and Reflexes     Gait  Gait: normal    Reflexes   Right brachioradialis: 2+  Left brachioradialis: 2+  Right biceps: 2+  Left biceps: 2+  Right triceps: 2+  Left triceps: 2+  Right patellar: 2+  Left patellar: 2+  Right achilles: 2+  Left achilles: 2+  Right : 2+  Left : 2+Station is normal.       Physical Exam  Vitals reviewed.   Constitutional:       Appearance: She is well-developed.   HENT:      Head: Normocephalic and atraumatic.   Eyes:      Extraocular Movements: EOM normal.      Pupils: Pupils are equal, round, and reactive to light.   Cardiovascular:      Rate and Rhythm: Normal rate and regular rhythm.   Pulmonary:      Breath sounds: Normal breath sounds.   Musculoskeletal:         General: Normal range of motion.   Skin:     General: Skin is warm.   Neurological:      Mental Status: She is oriented to person, place, and time.      Gait: Gait is intact.      Deep Tendon Reflexes:      Reflex Scores:       Tricep reflexes are 2+ on the right side and 2+ on the left side.       Bicep reflexes are 2+ on the right side and 2+ on the left side.       Brachioradialis reflexes are 2+ on the right side and 2+ on the left side.       Patellar reflexes are 2+ on the right side and 2+ on the left side.       Achilles reflexes are 2+ on the right side and 2+ on the left side.        Procedures    Assessment/Plan: We are going to refer her for physical therapy.  We will also prescribe wrist splints for both wrists.  Continue Copaxone as scheduled.  Will see her back in 6 months or sooner if needed.  A total of 30 minutes was spent face-to-face with the patient today.  Of that greater than 50% of this time was spent discussing signs and symptoms of multiple sclerosis, carpal tunnel syndrome, patient education, plan of care and prognosis.         Diagnoses and all orders for this visit:    1. Bilateral carpal tunnel syndrome (Primary)  -      Wrist Hand  Orthosis, Wrist Extension Control Cock-up    2. Multiple sclerosis  -     Ambulatory Referral to Physical Therapy    3. Low serum vitamin B12    4. Impairment of balance  -     Ambulatory Referral to Physical Therapy           Castillo Desai II, MD

## 2024-01-15 DIAGNOSIS — R20.2 PARESTHESIAS: ICD-10-CM

## 2024-01-15 DIAGNOSIS — E53.8 LOW SERUM VITAMIN B12: ICD-10-CM

## 2024-01-16 RX ORDER — CYANOCOBALAMIN 1000 UG/ML
INJECTION, SOLUTION INTRAMUSCULAR; SUBCUTANEOUS
Qty: 12 ML | Refills: 0 | Status: SHIPPED | OUTPATIENT
Start: 2024-01-16

## 2024-01-24 ENCOUNTER — TREATMENT (OUTPATIENT)
Dept: PHYSICAL THERAPY | Facility: CLINIC | Age: 59
End: 2024-01-24
Payer: COMMERCIAL

## 2024-01-24 DIAGNOSIS — R26.9 GAIT DISTURBANCE: ICD-10-CM

## 2024-01-24 DIAGNOSIS — R26.89 BALANCE PROBLEM: Primary | ICD-10-CM

## 2024-01-24 DIAGNOSIS — R29.898 BILATERAL LEG WEAKNESS: ICD-10-CM

## 2024-01-24 PROCEDURE — 97110 THERAPEUTIC EXERCISES: CPT | Performed by: PHYSICAL THERAPIST

## 2024-01-24 PROCEDURE — 97161 PT EVAL LOW COMPLEX 20 MIN: CPT | Performed by: PHYSICAL THERAPIST

## 2024-01-24 PROCEDURE — 97112 NEUROMUSCULAR REEDUCATION: CPT | Performed by: PHYSICAL THERAPIST

## 2024-01-24 NOTE — PROGRESS NOTES
Physical Therapy Initial Evaluation and Plan of Care    [id number] [facility] [id number]    Patient: Akila Goodrich   : 1965  Diagnosis/ICD-10 Code:  Balance problem [R26.89]  Referring practitioner: Castillo Desai II, MD  Date of Initial Visit: 2024  Today's Date: 2024  Patient seen for 1 sessions           Subjective Questionnaire: ABC: 55%      Subjective Evaluation    History of Present Illness  Mechanism of injury: Pt has been compliant with exercises from PT last year; doing YouTube videos for chair yoga, back exercises, did water classes for a couple months. Reports she feels like she is getting stronger but does not feel stable on her feet. Pt reports instability with reaching without UE support; sometimes just standing there I can lose my balance. Pt reports she uses cane/walking stick sometimes especially on uneven ground. Walking to the mailbox is challenging; along with outdoor step describing longer steps to enter home. Decreased back pain since starting B12 shots; improving L knee pain but still there. Occasional giving out of L knee. C/o some L shoulder tightness at the end of the day; improves with activity. Pt reports last fall before .     PMH: MS dx in , UTI, new B12 shots, anxiety/depression    Quality of life: excellent    Pain  Aggravating factors: stairs, standing, prolonged positioning, ambulation, squatting and repetitive movement (difficulty with)    Social Support  Lives in: one-story house (with basement, 2 steps into craft room, 2-3 stairs to enter front and back doors)  Lives with: spouse    Hand dominance: right    Diagnostic Tests  No diagnostic tests performed    Patient Goals  Patient goals for therapy: decreased pain, improved balance, increased motion, increased strength, independence with ADLs/IADLs and return to sport/leisure activities             Objective          Active Range of Motion   Left Shoulder   Normal active range of  motion    Right Shoulder   Normal active range of motion    Strength/Myotome Testing     Left Shoulder     Planes of Motion   Flexion: 4+   Abduction: 4+   External rotation at 0°: 4   Internal rotation at 0°: 4+     Right Shoulder     Planes of Motion   Flexion: 4+   Abduction: 4+   External rotation at 0°: 4   Internal rotation at 0°: 4     Left Hip   Planes of Motion   Flexion: 4-  Abduction: 4-  Adduction: 4-    Right Hip   Planes of Motion   Flexion: 4  Abduction: 4-  Adduction: 4-    Left Knee   Flexion: 4-  Extension: 4    Right Knee   Flexion: 4-  Extension: 4+    Left Ankle/Foot   Dorsiflexion: 4    Right Ankle/Foot   Dorsiflexion: 4    Ambulation     Observational Gait   Gait: within functional limits   Decreased left step length and right step length.     Additional Observational Gait Details  Decreased foot clearance BLE    Quality of Movement During Gait     Knee    Knee (Left): Positive valgus.   Knee (Right): Positive valgus.     Functional Assessment     Comments  30 sec STS 11x    ModCTSIB  Firm, EO: 30 sec  Firm, EC: 3 sec  Foam, EO: 30 sec  Foam, EC: 26 sec    Tandem with LLE behind: 30 sec  Tandem with RLE behind: 4 sec    Single leg balance  L: 9 sec  R: 1-2 sec        Assessment & Plan       Assessment  Impairments: abnormal gait, abnormal muscle firing, abnormal muscle tone, abnormal or restricted ROM, activity intolerance, impaired balance, impaired physical strength, lacks appropriate home exercise program and safety issue   Functional limitations: carrying objects, lifting, walking, pulling, pushing, standing, reaching behind back, reaching overhead and unable to perform repetitive tasks   Assessment details: Akila Goodrich is a 58 y.o. year-old female referred to physical therapy for balance and gait impairments with LE weakness. She presents with a evolving clinical presentation.  She has comorbidities of MS and personal factors of good motivation for exercise that may affect her progress  in the plan of care.  Signs and symptoms are consistent with physical therapy diagnosis of proximal hip and HS weakness with impaired balance and stability with ADLs. Patient is appropriate for skilled physical therapy in order to reduce pain and increase ease with daily mobility. Pt educated on anatomy, goal of interventions, safety with balance training exercises at home, HEP handout.     Prognosis: good    Goals  Plan Goals: Plan Goals: STG 30 days    Pt will demonstrate understanding of initial HEP without need for cueing.    Pt will demonstrate increased strength of B hip ABD to 4/5 or greater for improved hip stability with upright ax    Pt will demonstrate increased strength of B HS to 4+/5 to improve foot clearance/swing phase of gait    LTG 90 days:    Pt will be independent with HEP for return to PLOF with decreased symptoms.    Pt will demonstrate improved balance on firm and foam surfaces with eyes closed to 30 sec.     Pt will demonstrate improvement in balance with increased SLS time to 15 sec on L and 8 sec on R    Pt will demonstrate tandem stance for 30 sec BLE without UE support      Plan  Therapy options: will be seen for skilled therapy services  Planned modality interventions: cryotherapy  Planned therapy interventions: abdominal trunk stabilization, ADL retraining, balance/weight-bearing training, body mechanics training, flexibility, functional ROM exercises, gait training, home exercise program, IADL retraining, manual therapy, neuromuscular re-education, postural training, soft tissue mobilization, spinal/joint mobilization, strengthening, stretching, therapeutic activities and transfer training  Treatment plan discussed with: patient  Plan details: 2x per week for 12 weeks        Visit Diagnoses:    ICD-10-CM ICD-9-CM   1. Balance problem  R26.89 781.99   2. Gait disturbance  R26.9 781.2   3. Bilateral leg weakness  R29.898 729.89       Timed:  Manual Therapy:    -     mins   91188;  Therapeutic Exercise:    20     mins  27524;     Neuromuscular Norah:    10    mins  82957;    Therapeutic Activity:     -     mins  14103;     Gait Training:      -     mins  14406;     Ultrasound:     -     mins  15551;    Electrical Stimulation:    -     mins  64170 ( );    Low Eval                       20      Mins  84381  Mod Eval                        -     Mins  87763  High Eval                       -     Mins  16322    Untimed:  Electrical Stimulation:    -     mins  97588 ( );  Mechanical Traction:    -     mins  35797;     Timed Treatment:   30   mins   Total Treatment:     55   mins    PT SIGNATURE: [employer] license: [id number]  DATE TREATMENT INITIATED: 1/24/2024    Initial Certification  Certification Period: 4/23/2024  I certify that the therapy services are furnished while this patient is under my care.  The services outlined above are required by this patient, and will be reviewed every 90 days.     PHYSICIAN: Castillo Desai II, MD      DATE:     Please sign and return via fax to [phone]. Thank you, [facility].

## 2024-02-01 ENCOUNTER — TREATMENT (OUTPATIENT)
Dept: PHYSICAL THERAPY | Facility: CLINIC | Age: 59
End: 2024-02-01
Payer: COMMERCIAL

## 2024-02-01 DIAGNOSIS — R26.9 GAIT DISTURBANCE: ICD-10-CM

## 2024-02-01 DIAGNOSIS — R26.89 BALANCE PROBLEM: Primary | ICD-10-CM

## 2024-02-01 DIAGNOSIS — R29.898 BILATERAL LEG WEAKNESS: ICD-10-CM

## 2024-02-01 PROCEDURE — 97110 THERAPEUTIC EXERCISES: CPT | Performed by: PHYSICAL THERAPIST

## 2024-02-01 PROCEDURE — 97112 NEUROMUSCULAR REEDUCATION: CPT | Performed by: PHYSICAL THERAPIST

## 2024-02-01 PROCEDURE — 97530 THERAPEUTIC ACTIVITIES: CPT | Performed by: PHYSICAL THERAPIST

## 2024-02-01 NOTE — PROGRESS NOTES
Physical Therapy Daily Treatment Note      Patient: Akila Goodrich   : 1965  Referring practitioner: Castillo Desai II, MD  Date of Initial Visit: Type: THERAPY  Noted: 2024  Today's Date: 2024  Patient seen for 2 sessions         Akila Goodrich reports: exercises flared up her sciatica; shooting pain at times in RLE. Having to use cane due to sciatic pain           Objective     - slump test RLE    See Exercise, Manual, and Modality Logs for complete treatment.       Assessment/Plan  Added stretches to address sciatic irritation; updated HEP. No increased in reps due to sciatic irritation last session. Decreased SLR and HS curls with increased pain in R posterior hip. Assess response to modified exercises and stretches to improve back/sciatic pain. Likely just muscle tightness/soreness from initiation of exercises.        Progress per Plan of Care and Progress strengthening /stabilization /functional activity           Timed:  Manual Therapy:    -     mins  19086;  Therapeutic Exercise:    20     mins  57948;     Neuromuscular Norah:    10    mins  08251;    Therapeutic Activity:     15     mins  00957;     Gait Training:      -     mins  88389;     Ultrasound:     -     mins  10833;      Untimed:  Electrical Stimulation:    -     mins  07574 ( );  Mechanical Traction:    -     mins  90988;   Dry needling:      -     mins  34965/    Timed Treatment:   45   mins   Total Treatment:     45   mins  Bertha Duran PT  Physical Therapist    License #: [id number]

## 2024-02-06 ENCOUNTER — TREATMENT (OUTPATIENT)
Dept: PHYSICAL THERAPY | Facility: CLINIC | Age: 59
End: 2024-02-06
Payer: COMMERCIAL

## 2024-02-06 DIAGNOSIS — R26.89 BALANCE PROBLEM: Primary | ICD-10-CM

## 2024-02-06 DIAGNOSIS — R29.898 BILATERAL LEG WEAKNESS: ICD-10-CM

## 2024-02-06 DIAGNOSIS — R26.9 GAIT DISTURBANCE: ICD-10-CM

## 2024-02-06 PROCEDURE — 97110 THERAPEUTIC EXERCISES: CPT | Performed by: PHYSICAL THERAPIST

## 2024-02-06 PROCEDURE — 97530 THERAPEUTIC ACTIVITIES: CPT | Performed by: PHYSICAL THERAPIST

## 2024-02-06 PROCEDURE — 97112 NEUROMUSCULAR REEDUCATION: CPT | Performed by: PHYSICAL THERAPIST

## 2024-02-06 NOTE — PROGRESS NOTES
Physical Therapy Daily Treatment Note      Patient: Akila Goodrich   : 1965  Referring practitioner: Castillo Desai II, MD  Date of Initial Visit: Type: THERAPY  Noted: 2024  Today's Date: 2024  Patient seen for 3 sessions         Akila Goodrich reports: sciatic pain is much better           Objective   See Exercise, Manual, and Modality Logs for complete treatment.       Assessment/Plan  No increased in sciatic pain since last session; able to progress to mini squats, marches and heel toe raises; progressed postural exercises from red to green TB. Continue to progress strength and stability as able without sciatic flare up.        Progress per Plan of Care and Progress strengthening /stabilization /functional activity           Timed:  Manual Therapy:    -     mins  41002;  Therapeutic Exercise:    10     mins  80180;     Neuromuscular Norah:    10    mins  93683;    Therapeutic Activity:     10     mins  97426;     Gait Training:      -     mins  91872;     Ultrasound:     -     mins  88358;      Untimed:  Electrical Stimulation:    -     mins  37455 ( );  Mechanical Traction:    -     mins  37267;   Dry needling:      -     mins  42582/    Timed Treatment:   30   mins   Total Treatment:     35   mins  Bertha Duran PT  Physical Therapist    License #: [id number]

## 2024-02-13 ENCOUNTER — TREATMENT (OUTPATIENT)
Dept: PHYSICAL THERAPY | Facility: CLINIC | Age: 59
End: 2024-02-13
Payer: COMMERCIAL

## 2024-02-13 DIAGNOSIS — R29.898 BILATERAL LEG WEAKNESS: ICD-10-CM

## 2024-02-13 DIAGNOSIS — R26.89 BALANCE PROBLEM: Primary | ICD-10-CM

## 2024-02-13 DIAGNOSIS — R26.9 GAIT DISTURBANCE: ICD-10-CM

## 2024-02-13 PROCEDURE — 97530 THERAPEUTIC ACTIVITIES: CPT | Performed by: PHYSICAL THERAPIST

## 2024-02-13 PROCEDURE — 97112 NEUROMUSCULAR REEDUCATION: CPT | Performed by: PHYSICAL THERAPIST

## 2024-02-13 PROCEDURE — 97110 THERAPEUTIC EXERCISES: CPT | Performed by: PHYSICAL THERAPIST

## 2024-02-16 RX ORDER — GLATIRAMER 40 MG/ML
INJECTION, SOLUTION SUBCUTANEOUS
Qty: 11.76 ML | Refills: 3 | Status: SHIPPED | OUTPATIENT
Start: 2024-02-16

## 2024-02-19 ENCOUNTER — OFFICE VISIT (OUTPATIENT)
Dept: FAMILY MEDICINE CLINIC | Facility: CLINIC | Age: 59
End: 2024-02-19
Payer: COMMERCIAL

## 2024-02-19 VITALS
HEIGHT: 68 IN | WEIGHT: 231.2 LBS | DIASTOLIC BLOOD PRESSURE: 84 MMHG | SYSTOLIC BLOOD PRESSURE: 112 MMHG | TEMPERATURE: 97.8 F | OXYGEN SATURATION: 96 % | BODY MASS INDEX: 35.04 KG/M2 | HEART RATE: 83 BPM

## 2024-02-19 DIAGNOSIS — G89.29 CHRONIC BILATERAL LOW BACK PAIN WITHOUT SCIATICA: ICD-10-CM

## 2024-02-19 DIAGNOSIS — Z00.00 ROUTINE GENERAL MEDICAL EXAMINATION AT A HEALTH CARE FACILITY: Primary | ICD-10-CM

## 2024-02-19 DIAGNOSIS — E53.8 LOW SERUM VITAMIN B12: ICD-10-CM

## 2024-02-19 DIAGNOSIS — M54.50 CHRONIC BILATERAL LOW BACK PAIN WITHOUT SCIATICA: ICD-10-CM

## 2024-02-19 DIAGNOSIS — F33.2 MODERATELY SEVERE RECURRENT MAJOR DEPRESSION: ICD-10-CM

## 2024-02-19 DIAGNOSIS — G35 MULTIPLE SCLEROSIS: ICD-10-CM

## 2024-02-19 RX ORDER — MELOXICAM 7.5 MG/1
7.5 TABLET ORAL DAILY
Qty: 30 TABLET | Refills: 3 | Status: SHIPPED | OUTPATIENT
Start: 2024-02-19

## 2024-02-19 RX ORDER — BUPROPION HYDROCHLORIDE 300 MG/1
300 TABLET ORAL DAILY
Qty: 90 TABLET | Refills: 0 | Status: SHIPPED | OUTPATIENT
Start: 2024-02-19

## 2024-02-19 RX ORDER — BACLOFEN 10 MG/1
10 TABLET ORAL 3 TIMES DAILY PRN
Qty: 90 TABLET | Refills: 1 | Status: SHIPPED | OUTPATIENT
Start: 2024-02-19

## 2024-02-19 NOTE — PROGRESS NOTES
"Chief Complaint  Annual Exam (Patient wanting to discuss medications as well)    Subjective        Akila Goodrich presents to [facility]  History of Present Illness patient is here for physical.  Health concerns: Worsening fatigue and depression.  She is reporting trouble getting out of bed and motivated for day which is not her normal as well as crying easily and more often than is normal for her.  She also wants to change her muscle relaxer for ongoing neck and back pain.  She had tried her 's baclofen and thought it was helpful without side effects.  Previously had been getting B12 injections but not recently.    MS seems to be stable.  PT has been restarted.    ARELI-7 Score: ARELI 7 Total Score: 4  PHQ-9 Total Score: PHQ-9: Brief Depression Severity Measure Score: 13    OAB seems to be stable with current medication, no side effects.  No hematuria, recent UTIs.  MS: Followed by neuro and seems to be currently stable.  Back and neck pain: Patient is on meloxicam daily and PPI for GI protection.  Denies worsening GERD symptoms.  No melena, changes to stool.    A review of systems was performed, and the pertinent positives are noted in the HPI.         Objective   Vital Signs:  /84 (BP Location: Right arm, Patient Position: Sitting, Cuff Size: Large Adult)   Pulse 83   Temp 97.8 °F (36.6 °C) (Temporal)   Ht 172.7 cm (67.99\")   Wt 105 kg (231 lb 3.2 oz)   SpO2 96%   BMI 35.16 kg/m²   Estimated body mass index is 35.16 kg/m² as calculated from the following:    Height as of this encounter: 172.7 cm (67.99\").    Weight as of this encounter: 105 kg (231 lb 3.2 oz).               Physical Exam  Vitals and nursing note reviewed.   Constitutional:       General: She is not in acute distress.     Appearance: She is well-developed. She is not ill-appearing.   HENT:      Head: Normocephalic and atraumatic.      Right Ear: Tympanic membrane, ear canal and external ear normal.      " Left Ear: Tympanic membrane, ear canal and external ear normal.      Mouth/Throat:      Mouth: Mucous membranes are moist.      Pharynx: Uvula midline. No posterior oropharyngeal erythema.   Eyes:      General: No scleral icterus.        Right eye: No discharge.         Left eye: No discharge.      Conjunctiva/sclera: Conjunctivae normal.   Neck:      Thyroid: No thyromegaly.   Cardiovascular:      Rate and Rhythm: Normal rate and regular rhythm.      Heart sounds: Normal heart sounds.   Pulmonary:      Effort: Pulmonary effort is normal.      Breath sounds: Normal breath sounds.   Abdominal:      General: Bowel sounds are normal. There is no distension.      Palpations: Abdomen is soft.      Tenderness: There is no abdominal tenderness.   Musculoskeletal:      Cervical back: Neck supple.      Comments: Ambulates with cane   Lymphadenopathy:      Cervical: No cervical adenopathy.   Skin:     General: Skin is warm and dry.   Neurological:      General: No focal deficit present.      Mental Status: She is alert and oriented to person, place, and time.   Psychiatric:         Mood and Affect: Mood normal.         Behavior: Behavior normal.        Result Review :                     Assessment and Plan     Diagnoses and all orders for this visit:    1. Routine general medical examination at a health care facility (Primary)  -     CBC & Differential  -     Comprehensive Metabolic Panel  -     Hemoglobin A1c  -     Lipid Panel With LDL / HDL Ratio  -     Cancel: Microalbumin / Creatinine Urine Ratio - Urine, Clean Catch  -     TSH Rfx On Abnormal To Free T4    2. Moderately severe recurrent major depression  -     buPROPion XL (Wellbutrin XL) 300 MG 24 hr tablet; Take 1 tablet by mouth Daily.  Dispense: 90 tablet; Refill: 0    3. Low serum vitamin B12  -     Vitamin B12    4. Chronic bilateral low back pain without sciatica  -     baclofen (LIORESAL) 10 MG tablet; Take 1 tablet by mouth 3 (Three) Times a Day As Needed for  Muscle Spasms.  Dispense: 90 tablet; Refill: 1  -     meloxicam (Mobic) 7.5 MG tablet; Take 1 tablet by mouth Daily.  Dispense: 30 tablet; Refill: 3    5. Multiple sclerosis    Appropriate health maintenance and prevention topics specific for this patient were discussed today.  Additionally, health goals, and health concerns addressed as appropriate.  Pt was encouraged to stay up to date on recommended screenings and vaccines based on USPSTF guidelines.     Depression: Seems to be worsening.  Patient has been tolerating Wellbutrin and brother then adding another medication and we will increase to 300 mg.  Side effects discussed.  Follow-up in 6 weeks to make sure efficacious.    Will check a B12 as she has had low B12 in the past.  May need to resume injections.    Will add baclofen up to 3 times daily as needed for muscle spasms, would make sense that with MS she may be having more spasticity causing muscle spasms.    MS: Followed by neurology.  Seems to be stable for the most part.    Needs CRC-should be current with Jose           Follow Up     No follow-ups on file.  Patient was given instructions and counseling regarding her condition or for health maintenance advice. Please see specific information pulled into the AVS if appropriate.

## 2024-02-20 ENCOUNTER — TREATMENT (OUTPATIENT)
Dept: PHYSICAL THERAPY | Facility: CLINIC | Age: 59
End: 2024-02-20
Payer: COMMERCIAL

## 2024-02-20 DIAGNOSIS — R29.898 BILATERAL LEG WEAKNESS: ICD-10-CM

## 2024-02-20 DIAGNOSIS — R26.89 BALANCE PROBLEM: Primary | ICD-10-CM

## 2024-02-20 DIAGNOSIS — R26.9 GAIT DISTURBANCE: ICD-10-CM

## 2024-02-20 PROCEDURE — 97110 THERAPEUTIC EXERCISES: CPT | Performed by: PHYSICAL THERAPIST

## 2024-02-20 PROCEDURE — 97112 NEUROMUSCULAR REEDUCATION: CPT | Performed by: PHYSICAL THERAPIST

## 2024-02-20 PROCEDURE — 97530 THERAPEUTIC ACTIVITIES: CPT | Performed by: PHYSICAL THERAPIST

## 2024-02-20 NOTE — PROGRESS NOTES
Physical Therapy Re-Assessment/Progress Note      Patient: Akila Goodrich   : 1965  Referring practitioner: Castillo Desai II, MD  Date of Initial Visit: Type: THERAPY  Noted: 2024  Today's Date: 2024  Patient seen for 5 sessions         Akila Goodrich reports: balance has been good; no sciatic flare ups lately, minimal soreness at L hip last session.    ABC: 58%; 50-80% = moderate level of physical functioning        Objective     Strength  Hip ABD: 3+/5 B  HS: 4/5 B    ModCTSIB  EO, firm: 30s  EO, foam: 30s  EC, firm: 30s  EC, foam: 30s    SLB  RLE: 4 sec  LLE: 5 sec    Tandem:  RLE: 30 sec  LLE: 30 sec    See Exercise, Manual, and Modality Logs for complete treatment.       Assessment/Plan  Pt able to tolerate increased reps today with minimal cues; still progressing towards strength goals. Pt making good progress towards all goals; compliant with HEP and appropriate for decreased frequency of PT to 1x per month. Pt would benefit from continued PT to progress strength and balance exercises. Updated and printed HEP for compliance.     Goals  Plan Goals: Plan Goals: STG 30 days     Pt will demonstrate understanding of initial HEP without need for cueing.-MET     Pt will demonstrate increased strength of B hip ABD to 4/5 or greater for improved hip stability with upright ax-PROGRESSING     Pt will demonstrate increased strength of B HS to 4+/5 to improve foot clearance/swing phase of gait-PROGRESSING     LTG 90 days:     Pt will be independent with HEP for return to PLOF with decreased symptoms.-MET     Pt will demonstrate improved balance on firm and foam surfaces with eyes closed to 30 sec.-MET      Pt will demonstrate improvement in balance with increased SLS time to 15 sec on L and 8 sec on R-PROGRESSING     Pt will demonstrate tandem stance for 30 sec BLE without UE support-MET     PLAN  1x per month for 6 months per POC    Progress per Plan of Care and Progress strengthening /stabilization  /functional activity           Timed:  Manual Therapy:    -     mins  38970;  Therapeutic Exercise:    15     mins  53155;     Neuromuscular Norah:    15    mins  75814;    Therapeutic Activity:     15     mins  61017;     Gait Training:      -     mins  49544;     Ultrasound:     -     mins  77520;      Untimed:  Electrical Stimulation:    -     mins  49460 ( );  Mechanical Traction:    -     mins  53798;   Dry needling:      -     mins  20607/20561    Timed Treatment:   45   mins   Total Treatment:     45   mins  Bertha Duran PT  Physical Therapist    License #: [id number]

## 2024-03-04 ENCOUNTER — LAB (OUTPATIENT)
Dept: LAB | Facility: HOSPITAL | Age: 59
End: 2024-03-04
Payer: COMMERCIAL

## 2024-03-04 LAB
ALBUMIN SERPL-MCNC: 4 G/DL (ref 3.5–5.2)
ALBUMIN/GLOB SERPL: 1.1 G/DL
ALP SERPL-CCNC: 76 U/L (ref 39–117)
ALT SERPL W P-5'-P-CCNC: 13 U/L (ref 1–33)
ANION GAP SERPL CALCULATED.3IONS-SCNC: 8.3 MMOL/L (ref 5–15)
AST SERPL-CCNC: 19 U/L (ref 1–32)
BASOPHILS # BLD AUTO: 0.09 10*3/MM3 (ref 0–0.2)
BASOPHILS NFR BLD AUTO: 1.3 % (ref 0–1.5)
BILIRUB SERPL-MCNC: 0.5 MG/DL (ref 0–1.2)
BUN SERPL-MCNC: 18 MG/DL (ref 6–20)
BUN/CREAT SERPL: 16.5 (ref 7–25)
CALCIUM SPEC-SCNC: 9.9 MG/DL (ref 8.6–10.5)
CHLORIDE SERPL-SCNC: 106 MMOL/L (ref 98–107)
CHOLEST SERPL-MCNC: 212 MG/DL (ref 0–200)
CO2 SERPL-SCNC: 26.7 MMOL/L (ref 22–29)
CREAT SERPL-MCNC: 1.09 MG/DL (ref 0.57–1)
DEPRECATED RDW RBC AUTO: 44.3 FL (ref 37–54)
EGFRCR SERPLBLD CKD-EPI 2021: 58.6 ML/MIN/1.73
EOSINOPHIL # BLD AUTO: 0.33 10*3/MM3 (ref 0–0.4)
EOSINOPHIL NFR BLD AUTO: 4.6 % (ref 0.3–6.2)
ERYTHROCYTE [DISTWIDTH] IN BLOOD BY AUTOMATED COUNT: 13.4 % (ref 12.3–15.4)
GLOBULIN UR ELPH-MCNC: 3.5 GM/DL
GLUCOSE SERPL-MCNC: 101 MG/DL (ref 65–99)
HBA1C MFR BLD: 5.8 % (ref 4.8–5.6)
HCT VFR BLD AUTO: 42.5 % (ref 34–46.6)
HDLC SERPL-MCNC: 63 MG/DL (ref 40–60)
HGB BLD-MCNC: 13.4 G/DL (ref 12–15.9)
IMM GRANULOCYTES # BLD AUTO: 0.01 10*3/MM3 (ref 0–0.05)
IMM GRANULOCYTES NFR BLD AUTO: 0.1 % (ref 0–0.5)
LDLC SERPL CALC-MCNC: 134 MG/DL (ref 0–100)
LDLC/HDLC SERPL: 2.09 {RATIO}
LYMPHOCYTES # BLD AUTO: 2.98 10*3/MM3 (ref 0.7–3.1)
LYMPHOCYTES NFR BLD AUTO: 41.6 % (ref 19.6–45.3)
MCH RBC QN AUTO: 28.3 PG (ref 26.6–33)
MCHC RBC AUTO-ENTMCNC: 31.5 G/DL (ref 31.5–35.7)
MCV RBC AUTO: 89.7 FL (ref 79–97)
MONOCYTES # BLD AUTO: 0.79 10*3/MM3 (ref 0.1–0.9)
MONOCYTES NFR BLD AUTO: 11 % (ref 5–12)
NEUTROPHILS NFR BLD AUTO: 2.97 10*3/MM3 (ref 1.7–7)
NEUTROPHILS NFR BLD AUTO: 41.4 % (ref 42.7–76)
NRBC BLD AUTO-RTO: 0 /100 WBC (ref 0–0.2)
PLATELET # BLD AUTO: 239 10*3/MM3 (ref 140–450)
PMV BLD AUTO: 9.6 FL (ref 6–12)
POTASSIUM SERPL-SCNC: 4.8 MMOL/L (ref 3.5–5.2)
PROT SERPL-MCNC: 7.5 G/DL (ref 6–8.5)
RBC # BLD AUTO: 4.74 10*6/MM3 (ref 3.77–5.28)
SODIUM SERPL-SCNC: 141 MMOL/L (ref 136–145)
TRIGL SERPL-MCNC: 87 MG/DL (ref 0–150)
TSH SERPL DL<=0.05 MIU/L-ACNC: 3.23 UIU/ML (ref 0.27–4.2)
VIT B12 BLD-MCNC: 986 PG/ML (ref 211–946)
VLDLC SERPL-MCNC: 15 MG/DL (ref 5–40)
WBC NRBC COR # BLD AUTO: 7.17 10*3/MM3 (ref 3.4–10.8)

## 2024-03-04 PROCEDURE — 80061 LIPID PANEL: CPT | Performed by: NURSE PRACTITIONER

## 2024-03-04 PROCEDURE — 82607 VITAMIN B-12: CPT | Performed by: NURSE PRACTITIONER

## 2024-03-04 PROCEDURE — 80050 GENERAL HEALTH PANEL: CPT | Performed by: NURSE PRACTITIONER

## 2024-03-04 PROCEDURE — 36415 COLL VENOUS BLD VENIPUNCTURE: CPT | Performed by: NURSE PRACTITIONER

## 2024-03-04 PROCEDURE — 83036 HEMOGLOBIN GLYCOSYLATED A1C: CPT | Performed by: NURSE PRACTITIONER

## 2024-03-04 RX ORDER — GLATIRAMER 40 MG/ML
INJECTION, SOLUTION SUBCUTANEOUS
Qty: 11.76 ML | Refills: 3 | OUTPATIENT
Start: 2024-03-04

## 2024-03-07 NOTE — PROGRESS NOTES
Patient notified of results via [employer]. I called and followed up with patient to make sure they saw the result interpretation per physician. Patient verbalized all understanding and had no further questions/requests. No further action necessary at this time. Thank you!

## 2024-03-26 ENCOUNTER — SPECIALTY PHARMACY (OUTPATIENT)
Dept: NEUROLOGY | Facility: CLINIC | Age: 59
End: 2024-03-26
Payer: COMMERCIAL

## 2024-04-25 ENCOUNTER — TRANSCRIBE ORDERS (OUTPATIENT)
Dept: ADMINISTRATIVE | Facility: HOSPITAL | Age: 59
End: 2024-04-25
Payer: COMMERCIAL

## 2024-04-25 DIAGNOSIS — Z12.31 VISIT FOR SCREENING MAMMOGRAM: Primary | ICD-10-CM

## 2024-04-30 ENCOUNTER — OFFICE VISIT (OUTPATIENT)
Dept: FAMILY MEDICINE CLINIC | Facility: CLINIC | Age: 59
End: 2024-04-30
Payer: COMMERCIAL

## 2024-04-30 VITALS
HEART RATE: 74 BPM | WEIGHT: 229.5 LBS | SYSTOLIC BLOOD PRESSURE: 124 MMHG | BODY MASS INDEX: 34.78 KG/M2 | TEMPERATURE: 97.8 F | DIASTOLIC BLOOD PRESSURE: 68 MMHG | OXYGEN SATURATION: 100 % | HEIGHT: 68 IN

## 2024-04-30 DIAGNOSIS — R73.03 PREDIABETES: Chronic | ICD-10-CM

## 2024-04-30 DIAGNOSIS — F33.2 MODERATELY SEVERE RECURRENT MAJOR DEPRESSION: Primary | Chronic | ICD-10-CM

## 2024-04-30 DIAGNOSIS — E78.2 MIXED HYPERLIPIDEMIA: Chronic | ICD-10-CM

## 2024-04-30 DIAGNOSIS — Z12.11 SCREEN FOR COLON CANCER: ICD-10-CM

## 2024-04-30 DIAGNOSIS — R79.89 ELEVATED SERUM CREATININE: ICD-10-CM

## 2024-04-30 PROCEDURE — 99214 OFFICE O/P EST MOD 30 MIN: CPT | Performed by: NURSE PRACTITIONER

## 2024-04-30 RX ORDER — BUPROPION HYDROCHLORIDE 300 MG/1
300 TABLET ORAL DAILY
Qty: 90 TABLET | Refills: 1 | Status: SHIPPED | OUTPATIENT
Start: 2024-04-30

## 2024-04-30 NOTE — PROGRESS NOTES
Chief Complaint  Prediabetes and Hyperlipidemia    Subjective        Akila Goodrich presents to [facility]  History of Present Illness    History of Present Illness  The patient is a 59-year-old female who presents for evaluation of multiple medical concerns. She is accompanied by her . She has MS and is followed by neuro and sx have been stable and without recent exacerbation.     The patient reports an improvement in her mood following the increased dosage of Wellbutrin during her last visit. Despite this, her energy levels remain unchanged.    The patient has been managing her pain with over-the-counter Tylenol, [facility] brand, depending on the severity of her pain. Depending on the severity of her pain, she resorts to 1 or 2 tablets. Occasionally, she may take 1 tablet before bedtime. Her current regimen includes Baclofen, which she takes sporadically, and chiropractic treatment for her shoulder pain.    The patient has been making efforts to improve her dietary habits, including increased consumption of grains and salads.    The patient has recently commenced taking fish oil supplements. She is not currently on any cholesterol-lowering medication. She expresses concern about potential hypotension associated with elevated cholesterol levels.    The patient acknowledges inadequate hydration and expresses concern about her kidney function. She is currently not taking oxybutynin due to its ineffectiveness.    Supplemental Information  Her allergies are about the same this time of year. She takes her allergy pill every morning and uses Flonase when allergies are really bad. She has not had the Flonase in a while. She thinks it is worse in the winter. She has not had a colonoscopy since her last visit in [facility]. She has sent off the request for  colonoscopy but has not heard from them yet. She has not been taking the B12 injections. She has an appointment with Dr. Desai  "in 07/2024. She denies shortness of breath. Her  states she coughs all the time.           Objective   Vital Signs:  /68   Pulse 74   Temp 97.8 °F (36.6 °C) (Temporal)   Ht 172.7 cm (67.99\")   Wt 104 kg (229 lb 8 oz)   SpO2 100%   BMI 34.90 kg/m²   Estimated body mass index is 34.9 kg/m² as calculated from the following:    Height as of this encounter: 172.7 cm (67.99\").    Weight as of this encounter: 104 kg (229 lb 8 oz).               Physical Exam  Vitals and nursing note reviewed.   Constitutional:       General: She is not in acute distress.     Appearance: She is well-developed. She is obese. She is not ill-appearing.   HENT:      Head: Normocephalic and atraumatic.      Right Ear: Tympanic membrane, ear canal and external ear normal.      Left Ear: Tympanic membrane, ear canal and external ear normal.      Mouth/Throat:      Mouth: Mucous membranes are moist.      Pharynx: Uvula midline. No posterior oropharyngeal erythema.   Eyes:      General: No scleral icterus.        Right eye: No discharge.         Left eye: No discharge.      Conjunctiva/sclera: Conjunctivae normal.   Neck:      Thyroid: No thyromegaly.   Cardiovascular:      Rate and Rhythm: Normal rate and regular rhythm.      Heart sounds: Normal heart sounds.   Pulmonary:      Effort: Pulmonary effort is normal.      Breath sounds: Normal breath sounds.   Abdominal:      General: Bowel sounds are normal.      Palpations: Abdomen is soft.      Tenderness: There is no abdominal tenderness.   Musculoskeletal:      Cervical back: Neck supple.   Lymphadenopathy:      Cervical: No cervical adenopathy.   Skin:     General: Skin is warm and dry.   Neurological:      Mental Status: She is alert and oriented to person, place, and time. Mental status is at baseline.   Psychiatric:         Mood and Affect: Mood normal.         Behavior: Behavior normal.          Physical Exam       Result Review :            Results  Laboratory Studies  A1c " is in the prediabetic range. B12 level is 900. Total cholesterol is 212, HDL is 63, LDL is 134.                Assessment and Plan     Diagnoses and all orders for this visit:    1. Moderately severe recurrent major depression (Primary)  -     buPROPion XL (Wellbutrin XL) 300 MG 24 hr tablet; Take 1 tablet by mouth Daily.  Dispense: 90 tablet; Refill: 1    2. Mixed hyperlipidemia  -     Lipid Panel With LDL / HDL Ratio; Future    3. Prediabetes  -     Hemoglobin A1c; Future    4. Elevated serum creatinine  -     Basic Metabolic Panel; Future    5. Screen for colon cancer  -     Ambulatory Referral For Screening Colonoscopy        Assessment & Plan  1. MDD-improved  The patient's Wellbutrin prescription will be renewed.Seems to be helpful.     2. Shoulder pain.  The patient was informed that her daily intake of Tylenol should not exceed 3000 mg. The patient was counseled to abstain from alcohol.    3. Prediabetes.  The patient was advised to monitor her carbohydrate intake and to engage in regular physical activity. An A1c test will be conducted in 3 months.    4. Elevated cholesterol.  The patient's 10-year risk score for cardiovascular events, stroke, or myocardial infarction is 5.7 percent. The patient was encouraged to lower her saturated fat intake, increase her physical activity, and consume healthy fats such as avocados, walnuts, olive oil, and fish. A lipid panel will be conducted in 3 months.    5. Decreased kidney function.  The patient was advised to maintain adequate hydration. Recheck creatinine with labs in 3 months. Avoid oxybutnin luis since not helpful    An order for a colonoscopy has been placed.    Follow-up  The patient is scheduled for a follow-up visit in 6 months.            Follow Up     No follow-ups on file.  Patient was given instructions and counseling regarding her condition or for health maintenance advice. Please see specific information pulled into the AVS if appropriate.    Patient or  patient representative verbalized consent for the use of Ambient Listening during the visit with  ADRIAN Nina for chart documentation. 5/13/2024  09:44 EDT    The 10-year ASCVD risk score (Sandeep RUST, et al., 2019) is: 5.6%    Values used to calculate the score:      Age: 59 years      Sex: Female      Is Non- : No      Diabetic: No      Tobacco smoker: Yes      Systolic Blood Pressure: 122 mmHg      Is BP treated: No      HDL Cholesterol: 63 mg/dL      Total Cholesterol: 212 mg/dL   Answers submitted by the patient for this visit:  Other (Submitted on 4/28/2024)  Have you had these symptoms before?: Yes  How long have you been having these symptoms?: 1-4 days  Primary Reason for Visit (Submitted on 4/28/2024)  What is the primary reason for your visit?: Other

## 2024-05-08 ENCOUNTER — OFFICE VISIT (OUTPATIENT)
Dept: OBSTETRICS AND GYNECOLOGY | Facility: CLINIC | Age: 59
End: 2024-05-08
Payer: COMMERCIAL

## 2024-05-08 VITALS
DIASTOLIC BLOOD PRESSURE: 78 MMHG | SYSTOLIC BLOOD PRESSURE: 122 MMHG | BODY MASS INDEX: 34.25 KG/M2 | WEIGHT: 226 LBS | HEIGHT: 68 IN

## 2024-05-08 DIAGNOSIS — Z11.51 SPECIAL SCREENING EXAMINATION FOR HUMAN PAPILLOMAVIRUS (HPV): ICD-10-CM

## 2024-05-08 DIAGNOSIS — Z01.419 ROUTINE GYNECOLOGICAL EXAMINATION: ICD-10-CM

## 2024-05-08 DIAGNOSIS — Z15.01 BRCA1 GENE MUTATION POSITIVE: ICD-10-CM

## 2024-05-08 DIAGNOSIS — Z01.419 PAP SMEAR, AS PART OF ROUTINE GYNECOLOGICAL EXAMINATION: Primary | ICD-10-CM

## 2024-05-08 DIAGNOSIS — Z15.09 BRCA1 GENE MUTATION POSITIVE: ICD-10-CM

## 2024-05-08 DIAGNOSIS — Z80.0 FAMILY HISTORY OF COLON CANCER: ICD-10-CM

## 2024-05-08 DIAGNOSIS — M54.50 CHRONIC BILATERAL LOW BACK PAIN WITHOUT SCIATICA: ICD-10-CM

## 2024-05-08 DIAGNOSIS — Z84.81 FAMILY HISTORY OF BRCA GENE POSITIVE: ICD-10-CM

## 2024-05-08 DIAGNOSIS — G89.29 CHRONIC BILATERAL LOW BACK PAIN WITHOUT SCIATICA: ICD-10-CM

## 2024-05-08 DIAGNOSIS — N32.81 OVERACTIVE BLADDER: ICD-10-CM

## 2024-05-08 DIAGNOSIS — G35 MULTIPLE SCLEROSIS: ICD-10-CM

## 2024-05-08 RX ORDER — NITROFURANTOIN 25; 75 MG/1; MG/1
100 CAPSULE ORAL 2 TIMES DAILY
Qty: 14 CAPSULE | Refills: 0 | Status: SHIPPED | OUTPATIENT
Start: 2024-05-08 | End: 2024-05-15

## 2024-05-08 RX ORDER — SOLIFENACIN SUCCINATE 10 MG/1
10 TABLET, FILM COATED ORAL DAILY
Qty: 30 TABLET | Refills: 9 | Status: SHIPPED | OUTPATIENT
Start: 2024-05-08 | End: 2025-05-08

## 2024-05-13 LAB
CYTOLOGIST CVX/VAG CYTO: NORMAL
CYTOLOGY CVX/VAG DOC CYTO: NORMAL
CYTOLOGY CVX/VAG DOC THIN PREP: NORMAL
DX ICD CODE: NORMAL
HPV I/H RISK 4 DNA CVX QL PROBE+SIG AMP: NEGATIVE
Lab: NORMAL
OTHER STN SPEC: NORMAL
STAT OF ADQ CVX/VAG CYTO-IMP: NORMAL

## 2024-05-24 DIAGNOSIS — G35 MULTIPLE SCLEROSIS: Primary | ICD-10-CM

## 2024-05-24 RX ORDER — GLATIRAMER 40 MG/ML
INJECTION, SOLUTION SUBCUTANEOUS
Qty: 11.76 ML | Refills: 3 | Status: SHIPPED | OUTPATIENT
Start: 2024-05-24

## 2024-05-27 ENCOUNTER — PATIENT MESSAGE (OUTPATIENT)
Dept: FAMILY MEDICINE CLINIC | Facility: CLINIC | Age: 59
End: 2024-05-27
Payer: COMMERCIAL

## 2024-05-28 RX ORDER — OMEPRAZOLE 20 MG/1
20 CAPSULE, DELAYED RELEASE ORAL DAILY
Qty: 90 CAPSULE | Refills: 1 | Status: SHIPPED | OUTPATIENT
Start: 2024-05-28

## 2024-05-28 NOTE — TELEPHONE ENCOUNTER
Rx Refill Note  Requested Prescriptions     Pending Prescriptions Disp Refills    omeprazole (priLOSEC) 20 MG capsule       Sig: Take 1 capsule by mouth Daily.      Last office visit with prescribing clinician: 4/30/2024   Last telemedicine visit with prescribing clinician: Visit date not found   Next office visit with prescribing clinician: Visit date not found                         Would you like a call back once the refill request has been completed: [] Yes [] No    If the office needs to give you a call back, can they leave a voicemail: [] Yes [] No    Clotilde Yates LPN  05/28/24, 10:02 EDT

## 2024-05-31 DIAGNOSIS — R79.89 ELEVATED SERUM CREATININE: ICD-10-CM

## 2024-05-31 DIAGNOSIS — E53.8 LOW SERUM VITAMIN B12: ICD-10-CM

## 2024-05-31 DIAGNOSIS — E78.2 MIXED HYPERLIPIDEMIA: ICD-10-CM

## 2024-05-31 DIAGNOSIS — R73.03 PREDIABETES: Primary | ICD-10-CM

## 2024-06-14 DIAGNOSIS — G89.29 CHRONIC BILATERAL LOW BACK PAIN WITHOUT SCIATICA: ICD-10-CM

## 2024-06-14 DIAGNOSIS — M54.50 CHRONIC BILATERAL LOW BACK PAIN WITHOUT SCIATICA: ICD-10-CM

## 2024-06-14 RX ORDER — MELOXICAM 7.5 MG/1
7.5 TABLET ORAL DAILY
Qty: 30 TABLET | Refills: 0 | Status: SHIPPED | OUTPATIENT
Start: 2024-06-14

## 2024-06-14 NOTE — TELEPHONE ENCOUNTER
Rx Refill Note  Requested Prescriptions     Pending Prescriptions Disp Refills    meloxicam (MOBIC) 7.5 MG tablet [Pharmacy Med Name: Meloxicam 7.5 MG Oral Tablet] 30 tablet 0     Sig: Take 1 tablet by mouth once daily      Last office visit with prescribing clinician: 4/30/2024   Last telemedicine visit with prescribing clinician: Visit date not found   Next office visit with prescribing clinician: Visit date not found                         Would you like a call back once the refill request has been completed: [] Yes [] No    If the office needs to give you a call back, can they leave a voicemail: [] Yes [] No    Rachel Watts Rep  06/14/24, 10:24 EDT

## 2024-06-28 ENCOUNTER — HOSPITAL ENCOUNTER (OUTPATIENT)
Dept: MAMMOGRAPHY | Facility: HOSPITAL | Age: 59
Discharge: HOME | End: 2024-06-28
Admitting: OBSTETRICS & GYNECOLOGY
Payer: COMMERCIAL

## 2024-06-28 ENCOUNTER — TELEPHONE (OUTPATIENT)
Dept: GASTROENTEROLOGY | Facility: CLINIC | Age: 59
End: 2024-06-28
Payer: COMMERCIAL

## 2024-06-28 DIAGNOSIS — Z12.31 VISIT FOR SCREENING MAMMOGRAM: ICD-10-CM

## 2024-06-28 PROCEDURE — 77067 SCR MAMMO BI INCL CAD: CPT

## 2024-06-28 PROCEDURE — 77063 BREAST TOMOSYNTHESIS BI: CPT

## 2024-06-28 NOTE — TELEPHONE ENCOUNTER
"        Hub staff attempted to follow warm transfer process and was unsuccessful     Caller: Akila Goodrich \"Li\"    Relationship to patient: Self    Best call back number: [phone]     Patient is needing: PATIENT IS CALLING TO SCHEDULE A COLONOSCOPY.  HER FAST TRACK QUESTIONNAIRE WAS SCANNED INTO HER CHART ON 5/13/24.  PLEASE CALL BACK TO SCHEDULE.           "

## 2024-07-03 ENCOUNTER — PREP FOR SURGERY (OUTPATIENT)
Dept: SURGERY | Facility: SURGERY CENTER | Age: 59
End: 2024-07-03
Payer: COMMERCIAL

## 2024-07-03 DIAGNOSIS — Z12.11 ENCOUNTER FOR SCREENING FOR MALIGNANT NEOPLASM OF COLON: Primary | ICD-10-CM

## 2024-07-03 RX ORDER — SODIUM CHLORIDE 0.9 % (FLUSH) 0.9 %
3 SYRINGE (ML) INJECTION EVERY 12 HOURS SCHEDULED
OUTPATIENT
Start: 2024-07-03

## 2024-07-03 RX ORDER — SODIUM CHLORIDE 0.9 % (FLUSH) 0.9 %
10 SYRINGE (ML) INJECTION AS NEEDED
OUTPATIENT
Start: 2024-07-03

## 2024-07-03 RX ORDER — SODIUM CHLORIDE, SODIUM LACTATE, POTASSIUM CHLORIDE, CALCIUM CHLORIDE 600; 310; 30; 20 MG/100ML; MG/100ML; MG/100ML; MG/100ML
30 INJECTION, SOLUTION INTRAVENOUS CONTINUOUS PRN
OUTPATIENT
Start: 2024-07-03

## 2024-07-09 ENCOUNTER — OFFICE VISIT (OUTPATIENT)
Dept: NEUROLOGY | Facility: CLINIC | Age: 59
End: 2024-07-09
Payer: COMMERCIAL

## 2024-07-09 VITALS
HEIGHT: 68 IN | BODY MASS INDEX: 35.01 KG/M2 | SYSTOLIC BLOOD PRESSURE: 126 MMHG | HEART RATE: 69 BPM | WEIGHT: 231 LBS | OXYGEN SATURATION: 99 % | DIASTOLIC BLOOD PRESSURE: 84 MMHG

## 2024-07-09 DIAGNOSIS — R42 DIZZINESS: ICD-10-CM

## 2024-07-09 DIAGNOSIS — E53.8 LOW SERUM VITAMIN B12: ICD-10-CM

## 2024-07-09 DIAGNOSIS — G35 MULTIPLE SCLEROSIS: Primary | ICD-10-CM

## 2024-07-09 PROCEDURE — 99214 OFFICE O/P EST MOD 30 MIN: CPT | Performed by: PSYCHIATRY & NEUROLOGY

## 2024-07-09 NOTE — PROGRESS NOTES
Chief Complaint   Patient presents with    Bilateral carpal tunnel syndrome    Multiple Sclerosis       Patient ID: Akila Goodrich is a 59 y.o. female.    HPI:  I had the pleasure of seeing your patient today. As you may know she is a 59-year-old female here for the management of MS. She currently takes generic Copaxone injections and has tolerated them well.  She denies any new symptoms aside from chronic numbness and tingling in both hands as well as chronic weakness of the right upper and lower extremity.  I did review her recent MRI images of the brain, cervical and thoracic spine. No new lesions were noted. Multiple T2 flair hyperintensities were seen however. They do appear chronic. She denies any double vision or loss of vision. No slurred speech or trouble getting words out. No new onset focal weakness of her arms or legs. She still does use a cane for assistance with ambulation.  She has had episodes of dizziness for quite some time.  It is described as a movement like sensation that worsens with change of position.    The following portions of the patient's history were reviewed and updated as appropriate: allergies, current medications, past family history, past medical history, past social history, past surgical history and problem list.    Review of Systems   Constitutional:  Positive for fatigue.   Musculoskeletal:  Positive for gait problem.   Neurological:  Positive for weakness and numbness. Negative for dizziness, tremors, seizures, syncope, facial asymmetry, speech difficulty, light-headedness and headaches.   Psychiatric/Behavioral:  Positive for confusion, decreased concentration and sleep disturbance. Negative for agitation, behavioral problems, dysphoric mood, hallucinations, self-injury and suicidal ideas. The patient is not nervous/anxious and is not hyperactive.       I have reviewed the review of systems above performed by my medical assistant.      Vitals:    07/09/24 1617   BP: 126/84    Pulse: 69   SpO2: 99%       Neurologic Exam     Mental Status   Oriented to person, place, and time.   Concentration: normal.   Level of consciousness: alert  Knowledge: consistent with education (No deficits found.).     Cranial Nerves     CN II   Visual fields full to confrontation.     CN III, IV, VI   Pupils are equal, round, and reactive to light.  Extraocular motions are normal.   CN III: no CN III palsy  CN VI: no CN VI palsy    CN V   Facial sensation intact.     CN VII   Facial expression full, symmetric.     CN VIII   CN VIII normal.     CN IX, X   CN IX normal.   CN X normal.     CN XI   CN XI normal.     CN XII   CN XII normal.     Motor Exam     Strength   Right neck flexion: 5/5  Left neck flexion: 5/5  Right neck extension: 5/5  Left neck extension: 5/5  Right deltoid: 5/5  Left deltoid: 5/5  Right biceps: 5/5  Left biceps: 5/5  Right triceps: 5/5  Left triceps: 5/5  Right wrist flexion: 5/5  Left wrist flexion: 5/5  Right wrist extension: 5/5  Left wrist extension: 5/5  Right interossei: 5/5  Left interossei: 5/5  Right abdominals: 5/5  Left abdominals: 5/5  Right iliopsoas: 5/5  Left iliopsoas: 5/5  Right quadriceps: 5/5  Left quadriceps: 5/5  Right hamstrin/5  Left hamstrin/5  Right glutei: 5/5  Left glutei: 5/5  Right anterior tibial: 5/5  Left anterior tibial: 5/5  Right posterior tibial: 5/5  Left posterior tibial: 5/5  Right peroneal: 5/5  Left peroneal: 5/5  Right gastroc: 5/5  Left gastroc: 5/5    Sensory Exam   Right leg light touch: decreased from ankle  Vibration normal.     Gait, Coordination, and Reflexes     Gait  Gait: normal    Reflexes   Right brachioradialis: 2+  Left brachioradialis: 2+  Right biceps: 2+  Left biceps: 2+  Right triceps: 2+  Left triceps: 2+  Right patellar: 2+  Left patellar: 2+  Right achilles: 2+  Left achilles: 2+  Right : 2+  Left : 2+Station is normal.       Physical Exam  Vitals reviewed.   Constitutional:       Appearance: She is  well-developed.   HENT:      Head: Normocephalic and atraumatic.   Eyes:      Extraocular Movements: EOM normal.      Pupils: Pupils are equal, round, and reactive to light.   Cardiovascular:      Rate and Rhythm: Normal rate and regular rhythm.   Pulmonary:      Breath sounds: Normal breath sounds.   Musculoskeletal:         General: Normal range of motion.   Skin:     General: Skin is warm.   Neurological:      Mental Status: She is oriented to person, place, and time.      Gait: Gait is intact.      Deep Tendon Reflexes:      Reflex Scores:       Tricep reflexes are 2+ on the right side and 2+ on the left side.       Bicep reflexes are 2+ on the right side and 2+ on the left side.       Brachioradialis reflexes are 2+ on the right side and 2+ on the left side.       Patellar reflexes are 2+ on the right side and 2+ on the left side.       Achilles reflexes are 2+ on the right side and 2+ on the left side.        Procedures    Assessment/Plan:   We are going to continue Copaxone as scheduled.  Will place a referral for her to the [facility] for the dizziness.  Will see her back in 6 months or sooner if needed.  A total of 45 minutes was spent face-to-face with the patient today.  Of that greater than 50% of this time was spent discussing signs and symptoms of multiple sclerosis, carpal tunnel syndrome, patient education, plan of care and prognosis.        Diagnoses and all orders for this visit:    1. Multiple sclerosis (Primary)    2. Low serum vitamin B12    3. Dizziness  -     Basic Vestibular Evaluation; Future           Castillo Desai II, MD

## 2024-07-12 DIAGNOSIS — G89.29 CHRONIC BILATERAL LOW BACK PAIN WITHOUT SCIATICA: ICD-10-CM

## 2024-07-12 DIAGNOSIS — M54.50 CHRONIC BILATERAL LOW BACK PAIN WITHOUT SCIATICA: ICD-10-CM

## 2024-07-12 RX ORDER — MELOXICAM 7.5 MG/1
7.5 TABLET ORAL DAILY
Qty: 30 TABLET | Refills: 1 | Status: SHIPPED | OUTPATIENT
Start: 2024-07-12

## 2024-07-12 NOTE — TELEPHONE ENCOUNTER
Rx Refill Note  Requested Prescriptions     Pending Prescriptions Disp Refills    meloxicam (MOBIC) 7.5 MG tablet [Pharmacy Med Name: Meloxicam 7.5 MG Oral Tablet] 30 tablet 0     Sig: Take 1 tablet by mouth once daily      Last office visit with prescribing clinician: 4/30/2024   Last telemedicine visit with prescribing clinician: Visit date not found   Next office visit with prescribing clinician: Visit date not found                         Would you like a call back once the refill request has been completed: [] Yes [] No    If the office needs to give you a call back, can they leave a voicemail: [] Yes [] No    Rachel Watts Rep  07/12/24, 14:37 EDT

## 2024-07-22 ENCOUNTER — OFFICE VISIT (OUTPATIENT)
Dept: MAMMOGRAPHY | Facility: CLINIC | Age: 59
End: 2024-07-22
Payer: COMMERCIAL

## 2024-07-22 VITALS
DIASTOLIC BLOOD PRESSURE: 80 MMHG | HEART RATE: 60 BPM | WEIGHT: 231 LBS | HEIGHT: 68 IN | OXYGEN SATURATION: 99 % | SYSTOLIC BLOOD PRESSURE: 124 MMHG | BODY MASS INDEX: 35.01 KG/M2

## 2024-07-22 DIAGNOSIS — Z15.01 BRCA1 GENE MUTATION POSITIVE IN FEMALE: Primary | ICD-10-CM

## 2024-07-22 DIAGNOSIS — Z15.09 BRCA1 GENE MUTATION POSITIVE IN FEMALE: Primary | ICD-10-CM

## 2024-07-22 DIAGNOSIS — Z91.89 AT HIGH RISK FOR BREAST CANCER: ICD-10-CM

## 2024-07-22 DIAGNOSIS — Z15.02 BRCA1 GENE MUTATION POSITIVE IN FEMALE: Primary | ICD-10-CM

## 2024-07-22 PROCEDURE — 99204 OFFICE O/P NEW MOD 45 MIN: CPT | Performed by: SURGERY

## 2024-07-22 NOTE — PROGRESS NOTES
Chief Complaint: Akila Goodrich is a 59 y.o.. female here today for FHx Breast Cancer        History of Present Illness:  Patient presents with family history breast cancer.   She is a nice 59-year-old white female who does have multiple sclerosis.  There is a strong family history for breast and ovarian cancer.  She has 2 sisters who have both had breast cancer over the age of 50.  One of the sisters also had ovarian cancer and underwent genetic testing.  She was found to be positive for the BRCA1 mutation and that prompted Li to get tested.  She is also positive for the BRCA1 mutation.    The patient had imaging performed in 2024 and I have personally reviewed those imaging studies.  The breast tissue is almost entirely fatty replaced and there are no suspicious calcifications, masses, or areas of architectural distortion.    The patient denies any prior history of breast biopsy and has not palpated anything of concern.      Review of Systems:  Review of Systems   All other systems reviewed and are negative.     I have reviewed the ROS as documented by the MA/LPN/RN Darin Fiore MD      Past Medical and Surgical History:  Breast Biopsy History:  Patient has not had a breast biopsy in the past.  Breast Cancer HIstory:  Patient does not have a past medical history of breast cancer.  Breast Operations, and year:  None  Social History     Tobacco Use   Smoking Status Light Smoker    Current packs/day: 0.00    Average packs/day: 0.5 packs/day for 18.0 years (9.0 ttl pk-yrs)    Types: Cigarettes    Start date: 1986    Last attempt to quit: 2004    Years since quittin.5    Passive exposure: Past   Smokeless Tobacco Never     Obstetric History:  Patient is postmenopausal, entered menopause naturally at age: 55   Number of pregnancies:4  Number of live births: 3  Number of abortions or miscarriages: 1  Age of delivery of first child: 22  Patient breast fed, for the following lenth of time:2   years  Length of time taking birth control pills:25 years  Patient has never taken hormone replacement    Past Surgical History:   Procedure Laterality Date    COLONOSCOPY      TUBAL ABDOMINAL LIGATION         Past Medical History:   Diagnosis Date    Allergic     Anxiety     Arthritis     Depression     Urinary tract infection 2021       Prior Hospitalizations, other than for surgery or childbirth, and year:  Multiple sclerosis     Social History:  Patient is .  Patient has 3 daughters.    Family History:  Family History   Problem Relation Age of Onset    Colon cancer Mother     Cancer Mother 51        Colon    Diabetes Mother     Hyperlipidemia Mother     Thyroid disease Mother     Breast cancer Sister         55    Cancer Sister         Breast    Breast cancer Sister         50's    Ovarian cancer Sister         59    Thyroid disease Sister     Diabetes Maternal Grandmother     Cancer Maternal Grandfather 40        Liver    Stroke Paternal Grandfather        Vital Signs:  Vitals:    07/22/24 1253   BP: 124/80   Pulse: 60   SpO2: 99%       Medications:    Current Outpatient Prescriptions:     Current Outpatient Medications:     baclofen (LIORESAL) 10 MG tablet, Take 1 tablet by mouth 3 (Three) Times a Day As Needed for Muscle Spasms., Disp: 90 tablet, Rfl: 1    buPROPion XL (Wellbutrin XL) 300 MG 24 hr tablet, Take 1 tablet by mouth Daily., Disp: 90 tablet, Rfl: 1    cetirizine (zyrTEC) 10 MG tablet, Take 1 tablet by mouth Daily., Disp: , Rfl:     fluticasone (FLONASE) 50 MCG/ACT nasal spray, 2 sprays into the nostril(s) as directed by provider Daily., Disp: , Rfl:     Glatiramer Acetate 40 MG/ML solution prefilled syringe, INJECT ONE SYRINGE SUBCUTANEOUSLY 3 TIMES A WEEK AT LEAST 48 HOURS APART. ALLOW TO WARM TO ROOM TEMP FOR 20 MINUTES. REFRIGERATE., Disp: 11.76 mL, Rfl: 3    meloxicam (MOBIC) 7.5 MG tablet, Take 1 tablet by mouth once daily, Disp: 30 tablet, Rfl: 1    Omega-3 Fatty Acids (FISH OIL PO),  Take  by mouth., Disp: , Rfl:     omeprazole (priLOSEC) 20 MG capsule, Take 1 capsule by mouth Daily., Disp: 90 capsule, Rfl: 1    POTASSIUM PO, Take  by mouth., Disp: , Rfl:     solifenacin (VESIcare) 10 MG tablet, Take 1 tablet by mouth Daily., Disp: 30 tablet, Rfl: 9    vitamin C (ASCORBIC ACID) 500 MG tablet, Take 1 tablet by mouth Daily., Disp: , Rfl:     Vitamin D, Cholecalciferol, (CHOLECALCIFEROL) 10 MCG (400 UNIT) tablet, Take 1 tablet by mouth Daily., Disp: , Rfl:     Physical Examination:  General Appearance:   Patient is in no distress.  She is well kept and has a  Psychiatric:  Patient with appropriate mood and affect. Alert and oriented to self, time, and place.    Breast, RIGHT:  medium sized, symmetric with the contralateral side.  Breast skin is without erythema, edema, rashes.  There are no visible abnormalities upon inspection during the arm-raising maneuver or with hands on hips in the sitting position. There is no nipple retraction, discharge or nipple/areolar skin changes.There are no masses palpable in the sitting or supine positions.    Breast, LEFT:  medium sized, symmetric with the contralateral side.  Breast skin is without erythema, edema, rashes.  There are no visible abnormalities upon inspection during the arm-raising maneuver or with hands on hips in the sitting position. There is no nipple retraction, discharge or nipple/areolar skin changes.There are no masses palpable in the sitting or supine positions.    Lymphatic:  There is no axillary, cervical, infraclavicular, or supraclavicular adenopathy bilaterally.    Gastrointestinal:  Abdomen is soft, nondistended, and nontender.  There was no obvious hepatosplenomegaly or abdominal mass.  There was no obvious hepatosplenomegaly or abdominal mass.  There was no obvious hepatosplenomegaly No obvious mass or hepatosplenomegaly.There are no scars from previous surgery.    Musculoskeletal:  Good strength in all 4 extremities.   There is  good range of motion in both shoulders.        Assessment:  1. BRCA1 gene mutation positive in female    The patient is aware that high risk breast cancer patients are typically identified because of the strong family history for breast cancer, a genetic mutation, LCIS, atypical hyperplasia, or a history of radiation to the chest wall under the age of 30.  She indeed is positive for the BRCA1 mutation.  She understands that she has a greater than 60% lifetime risk of developing breast cancer with those risks leveling off around the age of 75.  She could consider alternating mammograms and MRIs every 6 months versus risk-reducing mastectomy.  She prefers surveillance.  We also talked about the possibility of chemoprevention and again she prefers just surveillance for now.      Plan:  1.  Orders were placed for MRI to be performed in December  2.  I would like to see her back in the office in January 2025.  3.  She will contact Dr. Worley regarding bilateral salpingo-oophorectomy or at least screening imaging.    CPT coding:    Next Appointment:  No follow-ups on file.            EMR Dragon/transcription disclaimer:    Much of this encounter note is an electronic transcription/translocation of spoken language to printed text.  The electronic translation of spoken language may permit erroneous, or at times, nonsensical words or phrases to be inadvertently transcribed.  Although I have reviewed the note from such areas, some may still exist.

## 2024-07-22 NOTE — LETTER
July 22, 2024     Jd Worley MD  [address]  [address]  [place] [address]    Patient: Akila Goodrich   YOB: 1965   Date of Visit: 7/22/2024     Dear Jd Worley MD:       Thank you for referring Akila Goodrich to me for evaluation. Below are the relevant portions of my assessment and plan of care.    Assessment:  1. BRCA1 gene mutation positive in female    The patient is aware that high risk breast cancer patients are typically identified because of the strong family history for breast cancer, a genetic mutation, LCIS, atypical hyperplasia, or a history of radiation to the chest wall under the age of 30.  She indeed is positive for the BRCA1 mutation.  She understands that she has a greater than 60% lifetime risk of developing breast cancer with those risks leveling off around the age of 75.  She could consider alternating mammograms and MRIs every 6 months versus risk-reducing mastectomy.  She prefers surveillance.  We also talked about the possibility of chemoprevention and again she prefers just surveillance for now.      Plan:  1.  Orders were placed for MRI to be performed in December  2.  I would like to see her back in the office in January 2025.  3.  She will contact Dr. Worley regarding bilateral salpingo-oophorectomy or at least screening imaging.    If you have questions, please do not hesitate to call me. I look forward to following Akila along with you.         Sincerely,        Darin Fiore MD        CC: Sowmya Romero, ADRIAN Fiore, Darin WILSON MD  07/22/24 6781  Sign when Signing Visit  Chief Complaint: Akila Goodrich is a 59 y.o.. female here today for FHx Breast Cancer        History of Present Illness:  Patient presents with family history breast cancer.   She is a nice 59-year-old white female who does have multiple sclerosis.  There is a strong family history for breast and ovarian cancer.  She has 2 sisters who have both had  breast cancer over the age of 50.  One of the sisters also had ovarian cancer and underwent genetic testing.  She was found to be positive for the BRCA1 mutation and that prompted Li to get tested.  She is also positive for the BRCA1 mutation.    The patient had imaging performed in 2024 and I have personally reviewed those imaging studies.  The breast tissue is almost entirely fatty replaced and there are no suspicious calcifications, masses, or areas of architectural distortion.    The patient denies any prior history of breast biopsy and has not palpated anything of concern.      Review of Systems:  Review of Systems   All other systems reviewed and are negative.     I have reviewed the ROS as documented by the MA/LPN/RN Darin Fiore MD      Past Medical and Surgical History:  Breast Biopsy History:  Patient has not had a breast biopsy in the past.  Breast Cancer HIstory:  Patient does not have a past medical history of breast cancer.  Breast Operations, and year:  None  Social History     Tobacco Use   Smoking Status Light Smoker   • Current packs/day: 0.00   • Average packs/day: 0.5 packs/day for 18.0 years (9.0 ttl pk-yrs)   • Types: Cigarettes   • Start date: 1986   • Last attempt to quit: 2004   • Years since quittin.5   • Passive exposure: Past   Smokeless Tobacco Never     Obstetric History:  Patient is postmenopausal, entered menopause naturally at age: 55   Number of pregnancies:4  Number of live births: 3  Number of abortions or miscarriages: 1  Age of delivery of first child: 22  Patient breast fed, for the following lenth of time:2 1/2 years  Length of time taking birth control pills:25 years  Patient has never taken hormone replacement    Past Surgical History:   Procedure Laterality Date   • COLONOSCOPY     • TUBAL ABDOMINAL LIGATION         Past Medical History:   Diagnosis Date   • Allergic    • Anxiety    • Arthritis    • Depression    • Urinary tract infection         Prior Hospitalizations, other than for surgery or childbirth, and year:  Multiple sclerosis     Social History:  Patient is .  Patient has 3 daughters.    Family History:  Family History   Problem Relation Age of Onset   • Colon cancer Mother    • Cancer Mother 51        Colon   • Diabetes Mother    • Hyperlipidemia Mother    • Thyroid disease Mother    • Breast cancer Sister         55   • Cancer Sister         Breast   • Breast cancer Sister         50's   • Ovarian cancer Sister         59   • Thyroid disease Sister    • Diabetes Maternal Grandmother    • Cancer Maternal Grandfather 40        Liver   • Stroke Paternal Grandfather        Vital Signs:  Vitals:    07/22/24 1253   BP: 124/80   Pulse: 60   SpO2: 99%       Medications:    Current Outpatient Prescriptions:     Current Outpatient Medications:   •  baclofen (LIORESAL) 10 MG tablet, Take 1 tablet by mouth 3 (Three) Times a Day As Needed for Muscle Spasms., Disp: 90 tablet, Rfl: 1  •  buPROPion XL (Wellbutrin XL) 300 MG 24 hr tablet, Take 1 tablet by mouth Daily., Disp: 90 tablet, Rfl: 1  •  cetirizine (zyrTEC) 10 MG tablet, Take 1 tablet by mouth Daily., Disp: , Rfl:   •  fluticasone (FLONASE) 50 MCG/ACT nasal spray, 2 sprays into the nostril(s) as directed by provider Daily., Disp: , Rfl:   •  Glatiramer Acetate 40 MG/ML solution prefilled syringe, INJECT ONE SYRINGE SUBCUTANEOUSLY 3 TIMES A WEEK AT LEAST 48 HOURS APART. ALLOW TO WARM TO ROOM TEMP FOR 20 MINUTES. REFRIGERATE., Disp: 11.76 mL, Rfl: 3  •  meloxicam (MOBIC) 7.5 MG tablet, Take 1 tablet by mouth once daily, Disp: 30 tablet, Rfl: 1  •  Omega-3 Fatty Acids (FISH OIL PO), Take  by mouth., Disp: , Rfl:   •  omeprazole (priLOSEC) 20 MG capsule, Take 1 capsule by mouth Daily., Disp: 90 capsule, Rfl: 1  •  POTASSIUM PO, Take  by mouth., Disp: , Rfl:   •  solifenacin (VESIcare) 10 MG tablet, Take 1 tablet by mouth Daily., Disp: 30 tablet, Rfl: 9  •  vitamin C (ASCORBIC ACID) 500 MG  tablet, Take 1 tablet by mouth Daily., Disp: , Rfl:   •  Vitamin D, Cholecalciferol, (CHOLECALCIFEROL) 10 MCG (400 UNIT) tablet, Take 1 tablet by mouth Daily., Disp: , Rfl:     Physical Examination:  General Appearance:   Patient is in no distress.  She is well kept and has a  Psychiatric:  Patient with appropriate mood and affect. Alert and oriented to self, time, and place.    Breast, RIGHT:  medium sized, symmetric with the contralateral side.  Breast skin is without erythema, edema, rashes.  There are no visible abnormalities upon inspection during the arm-raising maneuver or with hands on hips in the sitting position. There is no nipple retraction, discharge or nipple/areolar skin changes.There are no masses palpable in the sitting or supine positions.    Breast, LEFT:  medium sized, symmetric with the contralateral side.  Breast skin is without erythema, edema, rashes.  There are no visible abnormalities upon inspection during the arm-raising maneuver or with hands on hips in the sitting position. There is no nipple retraction, discharge or nipple/areolar skin changes.There are no masses palpable in the sitting or supine positions.    Lymphatic:  There is no axillary, cervical, infraclavicular, or supraclavicular adenopathy bilaterally.    Gastrointestinal:  Abdomen is soft, nondistended, and nontender.  There was no obvious hepatosplenomegaly or abdominal mass.  There was no obvious hepatosplenomegaly or abdominal mass.  There was no obvious hepatosplenomegaly No obvious mass or hepatosplenomegaly.There are no scars from previous surgery.    Musculoskeletal:  Good strength in all 4 extremities.   There is good range of motion in both shoulders.        Assessment:  1. BRCA1 gene mutation positive in female    The patient is aware that high risk breast cancer patients are typically identified because of the strong family history for breast cancer, a genetic mutation, LCIS, atypical hyperplasia, or a history of  radiation to the chest wall under the age of 30.  She indeed is positive for the BRCA1 mutation.  She understands that she has a greater than 60% lifetime risk of developing breast cancer with those risks leveling off around the age of 75.  She could consider alternating mammograms and MRIs every 6 months versus risk-reducing mastectomy.  She prefers surveillance.  We also talked about the possibility of chemoprevention and again she prefers just surveillance for now.      Plan:  1.  Orders were placed for MRI to be performed in December  2.  I would like to see her back in the office in January 2025.  3.  She will contact Dr. Worley regarding bilateral salpingo-oophorectomy or at least screening imaging.    CPT coding:    Next Appointment:  No follow-ups on file.            EMR Dragon/transcription disclaimer:    Much of this encounter note is an electronic transcription/translocation of spoken language to printed text.  The electronic translation of spoken language may permit erroneous, or at times, nonsensical words or phrases to be inadvertently transcribed.  Although I have reviewed the note from such areas, some may still exist.

## 2024-07-24 RX ORDER — SOLIFENACIN SUCCINATE 10 MG/1
10 TABLET, FILM COATED ORAL DAILY
Qty: 90 TABLET | Refills: 2 | Status: SHIPPED | OUTPATIENT
Start: 2024-07-24 | End: 2025-07-24

## 2024-07-26 ENCOUNTER — PATIENT ROUNDING (BHMG ONLY) (OUTPATIENT)
Dept: MAMMOGRAPHY | Facility: CLINIC | Age: 59
End: 2024-07-26
Payer: COMMERCIAL

## 2024-07-26 NOTE — PROGRESS NOTES
July 26, 2024    Hello, may I speak with Akila Goodrich?    My name is Kamryn      I am  with [facility]  [address] [address] [phone].    Before we get started may I verify your date of birth? 1965    I am calling to officially welcome you to our practice and ask about your recent visit. Is this a good time to talk? No, pt did not answer. MB     Tell me about your visit with us. What things went well?         We're always looking for ways to make our patients' experiences even better. Do you have recommendations on ways we may improve?      Overall were you satisfied with your first visit to our practice?        I appreciate you taking the time to speak with me today. Is there anything else I can do for you?       Thank you, and have a great day.

## 2024-07-30 ENCOUNTER — OFFICE VISIT (OUTPATIENT)
Dept: FAMILY MEDICINE CLINIC | Facility: CLINIC | Age: 59
End: 2024-07-30
Payer: COMMERCIAL

## 2024-07-30 VITALS
SYSTOLIC BLOOD PRESSURE: 132 MMHG | WEIGHT: 231 LBS | DIASTOLIC BLOOD PRESSURE: 90 MMHG | HEART RATE: 64 BPM | TEMPERATURE: 98.3 F | HEIGHT: 68 IN | BODY MASS INDEX: 35.01 KG/M2 | OXYGEN SATURATION: 99 %

## 2024-07-30 DIAGNOSIS — G35 MULTIPLE SCLEROSIS: Primary | ICD-10-CM

## 2024-07-30 DIAGNOSIS — G89.29 CHRONIC BILATERAL LOW BACK PAIN WITHOUT SCIATICA: ICD-10-CM

## 2024-07-30 DIAGNOSIS — F33.2 MODERATELY SEVERE RECURRENT MAJOR DEPRESSION: Chronic | ICD-10-CM

## 2024-07-30 DIAGNOSIS — M54.50 CHRONIC BILATERAL LOW BACK PAIN WITHOUT SCIATICA: ICD-10-CM

## 2024-07-30 DIAGNOSIS — K21.9 GASTROESOPHAGEAL REFLUX DISEASE, UNSPECIFIED WHETHER ESOPHAGITIS PRESENT: ICD-10-CM

## 2024-07-30 DIAGNOSIS — Z15.09 BRCA1 GENE MUTATION POSITIVE: ICD-10-CM

## 2024-07-30 DIAGNOSIS — Z15.01 BRCA1 GENE MUTATION POSITIVE: ICD-10-CM

## 2024-07-30 PROCEDURE — 99214 OFFICE O/P EST MOD 30 MIN: CPT | Performed by: FAMILY MEDICINE

## 2024-07-30 RX ORDER — BUPROPION HYDROCHLORIDE 150 MG/1
150 TABLET ORAL DAILY
Qty: 90 TABLET | Refills: 1 | Status: SHIPPED | OUTPATIENT
Start: 2024-07-30

## 2024-07-30 RX ORDER — SOLIFENACIN SUCCINATE 10 MG/1
10 TABLET, FILM COATED ORAL DAILY
Qty: 90 TABLET | Refills: 2 | Status: SHIPPED | OUTPATIENT
Start: 2024-07-30 | End: 2025-07-30

## 2024-07-30 RX ORDER — MELOXICAM 7.5 MG/1
7.5 TABLET ORAL DAILY
Qty: 90 TABLET | Refills: 1 | Status: SHIPPED | OUTPATIENT
Start: 2024-07-30

## 2024-07-30 RX ORDER — OMEPRAZOLE 20 MG/1
20 CAPSULE, DELAYED RELEASE ORAL DAILY
Qty: 90 CAPSULE | Refills: 1 | Status: SHIPPED | OUTPATIENT
Start: 2024-07-30

## 2024-07-30 RX ORDER — CALCIUM CARBONATE 300MG(750)
400 TABLET,CHEWABLE ORAL DAILY
COMMUNITY

## 2024-07-30 NOTE — PROGRESS NOTES
"  Subjective   Akila Goodrich is a 59 y.o. female who is here for   Chief Complaint   Patient presents with    Establish Care    Multiple Sclerosis     Needs referral    Hyperlipidemia   .     History of Present Illness   Patient presents to the office to establish care.  Patient has a history of multiple medical problems including multiple sclerosis, GERD, BRCA1 gene mutation, and overactive bladder.    Patient is currently seeing neurology Dr. Desai but is requesting to see a neurologist closer to home.  She is currently on generic Copaxone injections and tolerating them well.  She denies any new symptoms of dizziness, weakness, visual changes.  Patient does describe some chronic numbness and tingling in both of her hands and her right upper extremities.  Patient has underwent multiple images of the brain and cervical and thoracic spine.    Patient also has a history of GERD.  Patient states that omeprazole works very well to help her symptoms.    Patient also has a history of BRCA1 gene mutation.  She does have family history of both breast cancer and ovarian cancer.  Patient is being scheduled for MRIs of the breast.  She does not want to have prophylactic surgery for breast or ovarian cancer.  Patient is following up with the specialist Dr. Worley.    History of Present Illness      Review of Systems   Constitutional:  Negative for activity change, appetite change, chills, fatigue and fever.   Respiratory:  Negative for cough and shortness of breath.    Cardiovascular:  Negative for chest pain and leg swelling.   Skin:  Negative for rash.   Neurological:  Positive for dizziness and numbness.       Objective   Vitals:    07/30/24 1452   BP: 132/90   BP Location: Left arm   Patient Position: Sitting   Cuff Size: Adult   Pulse: 64   Temp: 98.3 °F (36.8 °C)   SpO2: 99%   Weight: 105 kg (231 lb)   Height: 172.7 cm (68\")      Physical Exam  Vitals and nursing note reviewed.   Constitutional:       Appearance: " Normal appearance. She is normal weight.   HENT:      Head: Normocephalic and atraumatic.   Cardiovascular:      Rate and Rhythm: Normal rate and regular rhythm.      Pulses: Normal pulses.      Heart sounds: No murmur heard.  Pulmonary:      Effort: Pulmonary effort is normal. No respiratory distress.      Breath sounds: Normal breath sounds. No wheezing.   Skin:     General: Skin is warm and dry.   Neurological:      General: No focal deficit present.      Mental Status: She is alert.   Psychiatric:         Mood and Affect: Mood normal.         Thought Content: Thought content normal.       Physical Exam        Assessment & Plan   Assessment & Plan    Diagnoses and all orders for this visit:    1. Multiple sclerosis (Primary)  Will refer to  for treatment and evaluation.  Patient is requesting a second opinion with a provider closer to her home.  Patient has been referred to Vestibular clinic to help with balance.   -     Ambulatory Referral to Neurology    2. BRCA1 gene mutation positive  Continue follow up with Dr. Worley.   Overview:  Formatting of this note might be different from the original.  P.*      3. Moderately severe recurrent major depression  Will decrease to wellbutrin Xl 150 mg po daily.   -     buPROPion XL (Wellbutrin XL) 150 MG 24 hr tablet; Take 1 tablet by mouth Daily.  Dispense: 90 tablet; Refill: 1    4. Gastroesophageal reflux disease, unspecified whether esophagitis present  Continue omeprazole 20 mg po daily   -     omeprazole (priLOSEC) 20 MG capsule; Take 1 capsule by mouth Daily.  Dispense: 90 capsule; Refill: 1    5. Chronic bilateral low back pain without sciatica  Continue mobixc 7.5 mg po daily.   -     meloxicam (MOBIC) 7.5 MG tablet; Take 1 tablet by mouth Daily.  Dispense: 90 tablet; Refill: 1    Other orders  -     solifenacin (VESIcare) 10 MG tablet; Take 1 tablet by mouth Daily.  Dispense: 90 tablet; Refill: 2      Results      There are no Patient  Instructions on file for this visit.    Medications Discontinued During This Encounter   Medication Reason    buPROPion XL (Wellbutrin XL) 300 MG 24 hr tablet     omeprazole (priLOSEC) 20 MG capsule Reorder    meloxicam (MOBIC) 7.5 MG tablet Reorder    solifenacin (VESIcare) 10 MG tablet Reorder        Return in about 3 months (around 10/30/2024), or MS,.          Santhosh Espinosa MD  [place], [place]

## 2024-08-01 ENCOUNTER — PATIENT ROUNDING (BHMG ONLY) (OUTPATIENT)
Dept: FAMILY MEDICINE CLINIC | Facility: CLINIC | Age: 59
End: 2024-08-01
Payer: COMMERCIAL

## 2024-08-12 ENCOUNTER — TELEPHONE (OUTPATIENT)
Dept: GASTROENTEROLOGY | Facility: CLINIC | Age: 59
End: 2024-08-12
Payer: COMMERCIAL

## 2024-08-12 NOTE — TELEPHONE ENCOUNTER
"    Caller: Akila Goodrich \"Li\"    Relationship to patient: Self    Best call back number: [phone]    Patient is needing: PATIENT NEEDS TO DISCUSS CALENDAR FOR UPCOMING PROCEDURE AND POSSIBLE RESCHEDULE DEPENDING ON HOW FAR OUT.  PLEASE REACH OUT.    "

## 2024-08-23 NOTE — TELEPHONE ENCOUNTER
I called and left a voice message stating that Dr. Vallejo at this moment does not have any appointments until 11/8/2024. I explained that we might have an opening sooner if someone cancels but that is a day to day basis.

## 2024-09-03 ENCOUNTER — TELEPHONE (OUTPATIENT)
Dept: GASTROENTEROLOGY | Facility: CLINIC | Age: 59
End: 2024-09-03
Payer: COMMERCIAL

## 2024-09-03 NOTE — TELEPHONE ENCOUNTER
I called and spoke to the patient and she wants to keep the 9/24/2024 at 9a cls with Dr. Vallejo.

## 2024-09-23 NOTE — SIGNIFICANT NOTE
Education provided the Patient on the following:    - Nothing to Eat or Drink after MN the night before the procedure    - Avoid red/purple fluids while completing their bowel prep as ordered by physician  -Contact Gastrointerologist office for any questions about specific details regarding colon prep    -You will need to have someone drive you home after your colonoscopy and remain with you for 24 hours after the procedure  - The date of your Surgery, you may have one visitor at bedside or within 10-15 minutes of [facility]  -Please wear warm socks when you arrive for your colonoscopy  -Remove all jewelry and leave any valuables before arriving the day of your procedure (all will have to be removed before leaving preop)  -You will need to arrive at 9/24 on 0915 for your colonoscopy    -Feel free to contact us at: [phone] with any additional questions/concerns

## 2024-09-24 ENCOUNTER — HOSPITAL ENCOUNTER (OUTPATIENT)
Facility: SURGERY CENTER | Age: 59
Setting detail: HOSPITAL OUTPATIENT SURGERY
Discharge: HOME | End: 2024-09-24
Attending: INTERNAL MEDICINE | Admitting: INTERNAL MEDICINE
Payer: COMMERCIAL

## 2024-09-24 ENCOUNTER — ANESTHESIA EVENT (OUTPATIENT)
Dept: SURGERY | Facility: SURGERY CENTER | Age: 59
End: 2024-09-24
Payer: COMMERCIAL

## 2024-09-24 ENCOUNTER — ANESTHESIA (OUTPATIENT)
Dept: SURGERY | Facility: SURGERY CENTER | Age: 59
End: 2024-09-24
Payer: COMMERCIAL

## 2024-09-24 VITALS
WEIGHT: 224 LBS | RESPIRATION RATE: 16 BRPM | OXYGEN SATURATION: 96 % | HEART RATE: 65 BPM | HEIGHT: 67 IN | SYSTOLIC BLOOD PRESSURE: 108 MMHG | TEMPERATURE: 97.9 F | BODY MASS INDEX: 35.16 KG/M2 | DIASTOLIC BLOOD PRESSURE: 74 MMHG

## 2024-09-24 DIAGNOSIS — Z12.11 ENCOUNTER FOR SCREENING FOR MALIGNANT NEOPLASM OF COLON: ICD-10-CM

## 2024-09-24 PROCEDURE — 25010000002 LIDOCAINE 1 % SOLUTION: Performed by: NURSE ANESTHETIST, CERTIFIED REGISTERED

## 2024-09-24 PROCEDURE — 25810000003 LACTATED RINGERS PER 1000 ML: Performed by: INTERNAL MEDICINE

## 2024-09-24 PROCEDURE — 25010000002 PROPOFOL 10 MG/ML EMULSION: Performed by: NURSE ANESTHETIST, CERTIFIED REGISTERED

## 2024-09-24 PROCEDURE — 45378 DIAGNOSTIC COLONOSCOPY: CPT | Performed by: INTERNAL MEDICINE

## 2024-09-24 PROCEDURE — 25010000002 PROPOFOL 1000 MG/100ML EMULSION: Performed by: NURSE ANESTHETIST, CERTIFIED REGISTERED

## 2024-09-24 RX ORDER — SODIUM CHLORIDE 0.9 % (FLUSH) 0.9 %
3 SYRINGE (ML) INJECTION EVERY 12 HOURS SCHEDULED
Status: DISCONTINUED | OUTPATIENT
Start: 2024-09-24 | End: 2024-09-24 | Stop reason: HOSPADM

## 2024-09-24 RX ORDER — LIDOCAINE HYDROCHLORIDE 10 MG/ML
INJECTION, SOLUTION INFILTRATION; PERINEURAL AS NEEDED
Status: DISCONTINUED | OUTPATIENT
Start: 2024-09-24 | End: 2024-09-24 | Stop reason: SURG

## 2024-09-24 RX ORDER — GLATIRAMER 40 MG/ML
INJECTION, SOLUTION SUBCUTANEOUS 3 TIMES WEEKLY
COMMUNITY

## 2024-09-24 RX ORDER — PROPOFOL 10 MG/ML
INJECTION, EMULSION INTRAVENOUS AS NEEDED
Status: DISCONTINUED | OUTPATIENT
Start: 2024-09-24 | End: 2024-09-24 | Stop reason: SURG

## 2024-09-24 RX ORDER — SODIUM CHLORIDE 0.9 % (FLUSH) 0.9 %
10 SYRINGE (ML) INJECTION AS NEEDED
Status: DISCONTINUED | OUTPATIENT
Start: 2024-09-24 | End: 2024-09-24 | Stop reason: HOSPADM

## 2024-09-24 RX ORDER — SODIUM CHLORIDE, SODIUM LACTATE, POTASSIUM CHLORIDE, CALCIUM CHLORIDE 600; 310; 30; 20 MG/100ML; MG/100ML; MG/100ML; MG/100ML
30 INJECTION, SOLUTION INTRAVENOUS CONTINUOUS PRN
Status: DISCONTINUED | OUTPATIENT
Start: 2024-09-24 | End: 2024-09-24 | Stop reason: HOSPADM

## 2024-09-24 RX ADMIN — PROPOFOL 90 MG: 10 INJECTION, EMULSION INTRAVENOUS at 08:55

## 2024-09-24 RX ADMIN — LIDOCAINE HYDROCHLORIDE 60 MG: 10 INJECTION, SOLUTION INFILTRATION; PERINEURAL at 08:55

## 2024-09-24 RX ADMIN — PROPOFOL 140 MCG/KG/MIN: 10 INJECTION, EMULSION INTRAVENOUS at 08:55

## 2024-09-24 RX ADMIN — SODIUM CHLORIDE, POTASSIUM CHLORIDE, SODIUM LACTATE AND CALCIUM CHLORIDE 30 ML/HR: 600; 310; 30; 20 INJECTION, SOLUTION INTRAVENOUS at 08:44

## 2024-09-24 NOTE — H&P
No chief complaint on file.      HPI  H/o polyps         Problem List:    Patient Active Problem List   Diagnosis    Multiple sclerosis    Polyarthralgia    Recurrent major depressive disorder, in partial remission    Psychophysiological insomnia    Gastroesophageal reflux disease    Thyroid nodule    Paraesophageal hernia    Overactive bladder    Hyperlipidemia    Family history of colon cancer    Family history of BRCA gene positive    Dysphagia    Chronic bilateral low back pain without sciatica    BRCA1 gene mutation positive    Encounter for screening for malignant neoplasm of colon       Medical History:    Past Medical History:   Diagnosis Date    Allergic     Anxiety     Arthritis     Depression     Fibromyalgia, primary     GERD (gastroesophageal reflux disease)     Urinary tract infection         Social History:    Social History     Socioeconomic History    Marital status:    Tobacco Use    Smoking status: Former     Current packs/day: 0.00     Average packs/day: 0.5 packs/day for 18.0 years (9.0 ttl pk-yrs)     Types: Cigarettes     Start date: 1986     Quit date: 2004     Years since quittin.7     Passive exposure: Past    Smokeless tobacco: Never   Vaping Use    Vaping status: Never Used   Substance and Sexual Activity    Alcohol use: Yes     Alcohol/week: 3.0 standard drinks of alcohol     Types: 2 Glasses of wine, 1 Drinks containing 0.5 oz of alcohol per week     Comment: Rarely    Drug use: Never    Sexual activity: Yes     Partners: Male     Birth control/protection: Tubal ligation       Family History:   Family History   Problem Relation Age of Onset    Colon cancer Mother     Cancer Mother 51        Colon    Diabetes Mother     Hyperlipidemia Mother     Thyroid disease Mother     Depression Mother     Breast cancer Sister         55    Cancer Sister         Breast    Breast cancer Sister         50's    Ovarian cancer Sister         59    Thyroid disease Sister     Cancer  Sister         Breast    Diabetes Maternal Grandmother     Hypertension Maternal Grandmother     Cancer Maternal Grandfather 40        Liver    Stroke Paternal Grandfather        Surgical History:   Past Surgical History:   Procedure Laterality Date    COLONOSCOPY      TUBAL ABDOMINAL LIGATION         No current facility-administered medications for this encounter.    Current Outpatient Medications:     baclofen (LIORESAL) 10 MG tablet, Take 1 tablet by mouth 3 (Three) Times a Day As Needed for Muscle Spasms., Disp: 90 tablet, Rfl: 1    buPROPion XL (Wellbutrin XL) 150 MG 24 hr tablet, Take 1 tablet by mouth Daily., Disp: 90 tablet, Rfl: 1    cetirizine (zyrTEC) 10 MG tablet, Take 1 tablet by mouth Daily., Disp: , Rfl:     Cyanocobalamin (VITAMIN B 12 PO), Take  by mouth Daily., Disp: , Rfl:     fluticasone (FLONASE) 50 MCG/ACT nasal spray, 2 sprays into the nostril(s) as directed by provider Daily., Disp: , Rfl:     Glatiramer Acetate 40 MG/ML solution prefilled syringe, INJECT ONE SYRINGE SUBCUTANEOUSLY 3 TIMES A WEEK AT LEAST 48 HOURS APART. ALLOW TO WARM TO ROOM TEMP FOR 20 MINUTES. REFRIGERATE., Disp: 11.76 mL, Rfl: 3    Magnesium 400 MG tablet, Take 400 mg by mouth Daily., Disp: , Rfl:     meloxicam (MOBIC) 7.5 MG tablet, Take 1 tablet by mouth Daily., Disp: 90 tablet, Rfl: 1    Omega-3 Fatty Acids (FISH OIL PO), Take  by mouth., Disp: , Rfl:     omeprazole (priLOSEC) 20 MG capsule, Take 1 capsule by mouth Daily., Disp: 90 capsule, Rfl: 1    POTASSIUM PO, Take  by mouth., Disp: , Rfl:     solifenacin (VESIcare) 10 MG tablet, Take 1 tablet by mouth Daily., Disp: 90 tablet, Rfl: 2    vitamin C (ASCORBIC ACID) 500 MG tablet, Take 1 tablet by mouth Daily., Disp: , Rfl:     Vitamin D, Cholecalciferol, (CHOLECALCIFEROL) 10 MCG (400 UNIT) tablet, Take 1 tablet by mouth Daily., Disp: , Rfl:     Allergies: No Known Allergies     The following portions of the patient's history were reviewed by me and updated as  appropriate: review of systems, allergies, current medications, past family history, past medical history, past social history, past surgical history and problem list.    There were no vitals filed for this visit.    PHYSICAL EXAM:    CONSTITUTIONAL:  today's vital signs reviewed by me  GASTROINTESTINAL: abdomen is soft nontender nondistended with normal active bowel sounds, no masses are appreciated    Assessment/ Plan  H/o polyps    colonoscopy    Risks and benefits as well as alternatives to endoscopic evaluation were explained to the patient and they voiced understanding and wish to proceed.  These risks include but are not limited to the risk of bleeding, perforation, adverse reaction to sedation, and missed lesions.  The patient was given the opportunity to ask questions prior to the endoscopic procedure.

## 2024-09-27 RX ORDER — GLATIRAMER ACETATE 40 MG/ML
INJECTION, SOLUTION SUBCUTANEOUS
Qty: 12 EACH | Refills: 2 | OUTPATIENT
Start: 2024-09-27

## 2024-10-09 NOTE — TELEPHONE ENCOUNTER
Caller: [facility] [place] - [place] - [phone] [facility] [phone] FX    Relationship: Pharmacy    Best call back number: [phone]    Requested Prescriptions:   Requested Prescriptions     Pending Prescriptions Disp Refills    Glatopa 40 MG/ML solution prefilled syringe [Pharmacy Med Name: GLATOPA SD PFS (12X1ML) 40MG/ML] 12 each 2     Sig: INJECT 1 SYRINGE UNDER THE SKIN 3 TIMES A WEEK AT LEAST 48 HOURS APART        Pharmacy where request should be sent: [facility] - [place], [place] - [address] [facility] [phone] [facility] [phone] FX  [facility] [place] - [place] - [phone]  - [phone] FX     Last office visit with prescribing clinician: 7/9/2024   Last telemedicine visit with prescribing clinician: Visit date not found   Next office visit with prescribing clinician: Visit date not found     Additional details provided by patient: PATIENT HAS ENOUGH FOR THIS WEEK AND NEXT BUT IT'S A MAIL ORDER PHARMACY SO THEY SAID THE SOONER THE BETTER.    Does the patient have less than a 3 day supply:  [] Yes  [x] No    Would you like a call back once the refill request has been completed: [] Yes [x] No    If the office needs to give you a call back, can they leave a voicemail: [] Yes [x] No    Rachel Centeno Rep   10/09/24 13:53 EDT

## 2024-10-10 RX ORDER — GLATIRAMER ACETATE 40 MG/ML
INJECTION, SOLUTION SUBCUTANEOUS
Qty: 12 EACH | Refills: 2 | Status: SHIPPED | OUTPATIENT
Start: 2024-10-10

## 2024-10-29 ENCOUNTER — OFFICE VISIT (OUTPATIENT)
Dept: NEUROLOGY | Facility: CLINIC | Age: 59
End: 2024-10-29
Payer: COMMERCIAL

## 2024-10-29 VITALS
OXYGEN SATURATION: 100 % | WEIGHT: 229.2 LBS | DIASTOLIC BLOOD PRESSURE: 76 MMHG | BODY MASS INDEX: 35.9 KG/M2 | SYSTOLIC BLOOD PRESSURE: 128 MMHG | HEART RATE: 62 BPM

## 2024-10-29 DIAGNOSIS — G35 MULTIPLE SCLEROSIS: Primary | ICD-10-CM

## 2024-10-29 PROCEDURE — 99214 OFFICE O/P EST MOD 30 MIN: CPT | Performed by: PSYCHIATRY & NEUROLOGY

## 2024-10-29 NOTE — PROGRESS NOTES
Notes by MA:  Ms Goodrich is here today as a referral from Dr Espinosa. Her , Trent accompanies her today.      Subjective:     Patient ID: Akila Goodrich is a 59 y.o. female.    Multiple Sclerosis  Associated symptoms include fatigue and weakness. Pertinent negatives include no abdominal pain, arthralgias, chest pain, headaches, joint swelling, myalgias, nausea, neck pain, numbness or vomiting.     The following portions of the patient's history were reviewed and updated as appropriate: allergies, current medications, past family history, past medical history, past social history, past surgical history, and problem list.    Review of Systems   Constitutional:  Positive for fatigue. Negative for activity change and appetite change.   HENT:  Negative for facial swelling, trouble swallowing and voice change.    Eyes:  Negative for photophobia, pain and visual disturbance.   Respiratory:  Negative for chest tightness, shortness of breath and wheezing.    Cardiovascular:  Negative for chest pain, palpitations and leg swelling.   Gastrointestinal:  Negative for abdominal pain, nausea and vomiting.   Endocrine: Negative for cold intolerance and heat intolerance.   Musculoskeletal:  Negative for arthralgias, back pain, gait problem, joint swelling, myalgias, neck pain and neck stiffness.   Neurological:  Positive for weakness. Negative for dizziness, tremors, seizures, syncope, facial asymmetry, speech difficulty, light-headedness, numbness and headaches.   Hematological:  Does not bruise/bleed easily.   Psychiatric/Behavioral:  Negative for agitation, behavioral problems, confusion, decreased concentration, dysphoric mood, hallucinations, self-injury, sleep disturbance and suicidal ideas. The patient is not nervous/anxious and is not hyperactive.         Objective:    Neurological Exam   She is awake alert pleasant and cooperative.  Speech is clear and fluent to speech comprehension is reasonable.  She has a normal  social demeanor.    Cranial nerves II through XII normal and symmetric.    Motor exam reveals no drift or lateralized spasticity.  Tone and bulk are reasonable.    Sensory exam reveals reduced temperature and light touch sensation of the distal lower extremity on the right.    Gait is little wide-based.  Finger-nose-finger is reasonably accurate.      Tendon reflexes are 2-3+ and reasonably symmetric today.  Physical Exam    Assessment/Plan:     Diagnoses and all orders for this visit:    1. Multiple sclerosis (Primary)     Mrs. Goodrich is here to establish care for multiple sclerosis (relocating for geographical convenience).  She was diagnosed some 34 years ago and was initially on Betaseron.  This transitioned over to glatiramer 5 years later and she remains on glatiramer, now at 40 mg 3 times weekly.  She appears to be tolerating it reasonably well and is rotating her sites appropriately to reduce the risk of lipo atrophy.  She is happy with this so far and tells me that her MRI scans have been fairly stable for the past few years.  Her last ones were performed this year at [facility].  Will arrange for those images to be power shared.  Her main complaint is fatigue but she declines new medications for that at the current time.  Will readdress surveillance MRIs and symptomatic medications when she returns in 3 months time.  Her secondary complaint is of imbalance and occasional falls and she is already set up for evaluation at the [facility] for evaluation and management in December.  I have encouraged her to continue her vitamin D and vitamin B12 supplementation.  I reviewed her prior workups and management at length.

## 2024-11-01 ENCOUNTER — PATIENT ROUNDING (BHMG ONLY) (OUTPATIENT)
Dept: NEUROLOGY | Facility: CLINIC | Age: 59
End: 2024-11-01
Payer: COMMERCIAL

## 2024-12-23 RX ORDER — GLATIRAMER ACETATE 40 MG/ML
INJECTION, SOLUTION SUBCUTANEOUS
Qty: 12 EACH | Refills: 2 | Status: SHIPPED | OUTPATIENT
Start: 2024-12-23

## 2025-01-02 ENCOUNTER — TELEPHONE (OUTPATIENT)
Dept: NEUROLOGY | Facility: CLINIC | Age: 60
End: 2025-01-02
Payer: COMMERCIAL

## 2025-01-27 DIAGNOSIS — F33.2 MODERATELY SEVERE RECURRENT MAJOR DEPRESSION: Chronic | ICD-10-CM

## 2025-01-27 RX ORDER — BUPROPION HYDROCHLORIDE 150 MG/1
150 TABLET ORAL DAILY
Qty: 30 TABLET | Refills: 0 | Status: SHIPPED | OUTPATIENT
Start: 2025-01-27

## 2025-01-28 ENCOUNTER — OFFICE VISIT (OUTPATIENT)
Dept: NEUROLOGY | Facility: CLINIC | Age: 60
End: 2025-01-28
Payer: COMMERCIAL

## 2025-01-28 VITALS
DIASTOLIC BLOOD PRESSURE: 90 MMHG | BODY MASS INDEX: 36.24 KG/M2 | SYSTOLIC BLOOD PRESSURE: 120 MMHG | OXYGEN SATURATION: 98 % | WEIGHT: 231.4 LBS | HEART RATE: 64 BPM

## 2025-01-28 DIAGNOSIS — G35 MS (MULTIPLE SCLEROSIS): Primary | ICD-10-CM

## 2025-01-28 PROCEDURE — 99214 OFFICE O/P EST MOD 30 MIN: CPT | Performed by: PSYCHIATRY & NEUROLOGY

## 2025-01-28 RX ORDER — GLATIRAMER 40 MG/ML
40 INJECTION, SOLUTION SUBCUTANEOUS 3 TIMES WEEKLY
Qty: 12 EACH | Refills: 4 | Status: SHIPPED | OUTPATIENT
Start: 2025-01-29

## 2025-01-28 NOTE — PROGRESS NOTES
Notes by MA:  Ms Goodrich is here today for a follow up appointment for MS. Her   accompanies her today.      Subjective:     Patient ID: Akila Goodrich is a 59 y.o. female.    History of Present Illness  The following portions of the patient's history were reviewed and updated as appropriate: allergies, current medications, past family history, past medical history, past social history, past surgical history, and problem list.    Review of Systems   Musculoskeletal:  Positive for gait problem (falls).      Objective:    Neurological Exam   She is awake alert pleasant and cooperative.  Speech is clear and fluent to speech comprehension is reasonable.  She has a normal social demeanor.     Cranial nerves II through XII normal and symmetric.     Motor exam reveals no drift or lateralized spasticity.  Tone and bulk are reasonable.     Sensory exam reveals reduced temperature and light touch sensation of the distal lower extremity on the right.     Gait is little wide-based.  Finger-nose-finger is reasonably accurate.       Tendon reflexes are 2-3+ and reasonably symmetric.  Physical Exam    Assessment/Plan:     Diagnoses and all orders for this visit:    1. MS (multiple sclerosis) (Primary)  -     MRI Brain With & Without Contrast; Future  -     MRI Thoracic Spine With & Without Contrast; Future    Other orders  -     Glatiramer Acetate (Glatopa) 40 MG/ML solution prefilled syringe; Inject 40 mg under the skin into the appropriate area as directed 3 (Three) Times a Week.  Dispense: 12 each; Refill: 4     Clinically and radiologically stable multiple sclerosis.  Continuing to do reasonably well with glatiramer.  I have refilled that for her today.  It is time for routine surveillance MRI imaging and I am arranging brain and thoracic spine (given the presence of a lesion previously noted in T-spine) with or without contrast and I will review the results and take any further measures that may be necessary.  Otherwise  we will see her back in about 4 months time.  Continuing vitamin D and B12 supplementation.

## 2025-02-19 ENCOUNTER — TELEPHONE (OUTPATIENT)
Dept: NEUROLOGY | Facility: CLINIC | Age: 60
End: 2025-02-19
Payer: COMMERCIAL

## 2025-02-19 NOTE — TELEPHONE ENCOUNTER
----- Message from Trent Randall sent at 2/19/2025 12:57 PM EST -----  Stable MRI.  No changes.  ----- Message -----  From: Interface, Scans Incoming  Sent: 2/7/2025   1:31 PM EST  To: Trent Randall MD

## 2025-02-20 ENCOUNTER — OFFICE VISIT (OUTPATIENT)
Dept: FAMILY MEDICINE CLINIC | Facility: CLINIC | Age: 60
End: 2025-02-20
Payer: COMMERCIAL

## 2025-02-20 VITALS
HEIGHT: 67 IN | BODY MASS INDEX: 36.1 KG/M2 | OXYGEN SATURATION: 98 % | WEIGHT: 230 LBS | DIASTOLIC BLOOD PRESSURE: 74 MMHG | HEART RATE: 65 BPM | SYSTOLIC BLOOD PRESSURE: 124 MMHG | TEMPERATURE: 97 F

## 2025-02-20 DIAGNOSIS — K44.9 HIATAL HERNIA: ICD-10-CM

## 2025-02-20 DIAGNOSIS — E78.2 MIXED HYPERLIPIDEMIA: ICD-10-CM

## 2025-02-20 DIAGNOSIS — M54.50 CHRONIC BILATERAL LOW BACK PAIN WITHOUT SCIATICA: ICD-10-CM

## 2025-02-20 DIAGNOSIS — K21.9 GASTROESOPHAGEAL REFLUX DISEASE, UNSPECIFIED WHETHER ESOPHAGITIS PRESENT: ICD-10-CM

## 2025-02-20 DIAGNOSIS — R73.03 PREDIABETES: ICD-10-CM

## 2025-02-20 DIAGNOSIS — Z00.00 ROUTINE GENERAL MEDICAL EXAMINATION AT A HEALTH CARE FACILITY: Primary | ICD-10-CM

## 2025-02-20 DIAGNOSIS — E04.1 THYROID NODULE: ICD-10-CM

## 2025-02-20 DIAGNOSIS — G35 MULTIPLE SCLEROSIS: ICD-10-CM

## 2025-02-20 DIAGNOSIS — G89.29 CHRONIC BILATERAL LOW BACK PAIN WITHOUT SCIATICA: ICD-10-CM

## 2025-02-20 PROCEDURE — 99396 PREV VISIT EST AGE 40-64: CPT | Performed by: FAMILY MEDICINE

## 2025-02-20 RX ORDER — MELOXICAM 15 MG/1
15 TABLET ORAL DAILY
Qty: 90 TABLET | Refills: 1 | Status: SHIPPED | OUTPATIENT
Start: 2025-02-20

## 2025-02-21 LAB
ALBUMIN SERPL-MCNC: 4.5 G/DL (ref 3.8–4.9)
ALP SERPL-CCNC: 82 IU/L (ref 44–121)
ALT SERPL-CCNC: 12 IU/L (ref 0–32)
AST SERPL-CCNC: 18 IU/L (ref 0–40)
BASOPHILS # BLD AUTO: 0.1 X10E3/UL (ref 0–0.2)
BASOPHILS NFR BLD AUTO: 2 %
BILIRUB SERPL-MCNC: 0.5 MG/DL (ref 0–1.2)
BUN SERPL-MCNC: 15 MG/DL (ref 8–27)
BUN/CREAT SERPL: 15 (ref 12–28)
CALCIUM SERPL-MCNC: 9.8 MG/DL (ref 8.7–10.3)
CHLORIDE SERPL-SCNC: 102 MMOL/L (ref 96–106)
CHOLEST SERPL-MCNC: 245 MG/DL (ref 100–199)
CO2 SERPL-SCNC: 24 MMOL/L (ref 20–29)
CREAT SERPL-MCNC: 1 MG/DL (ref 0.57–1)
CREAT UR-MCNC: NORMAL MG/DL
EGFRCR SERPLBLD CKD-EPI 2021: 64 ML/MIN/1.73
EOSINOPHIL # BLD AUTO: 0.2 X10E3/UL (ref 0–0.4)
EOSINOPHIL NFR BLD AUTO: 3 %
ERYTHROCYTE [DISTWIDTH] IN BLOOD BY AUTOMATED COUNT: 12.9 % (ref 11.7–15.4)
GLOBULIN SER CALC-MCNC: 3 G/DL (ref 1.5–4.5)
GLUCOSE SERPL-MCNC: 96 MG/DL (ref 70–99)
HBA1C MFR BLD: 5.6 % (ref 4.8–5.6)
HCT VFR BLD AUTO: 43 % (ref 34–46.6)
HDLC SERPL-MCNC: 66 MG/DL
HGB BLD-MCNC: 13.9 G/DL (ref 11.1–15.9)
IMM GRANULOCYTES # BLD AUTO: 0 X10E3/UL (ref 0–0.1)
IMM GRANULOCYTES NFR BLD AUTO: 0 %
LDLC SERPL CALC-MCNC: 159 MG/DL (ref 0–99)
LYMPHOCYTES # BLD AUTO: 2.4 X10E3/UL (ref 0.7–3.1)
LYMPHOCYTES NFR BLD AUTO: 39 %
MCH RBC QN AUTO: 29.4 PG (ref 26.6–33)
MCHC RBC AUTO-ENTMCNC: 32.3 G/DL (ref 31.5–35.7)
MCV RBC AUTO: 91 FL (ref 79–97)
MICROALBUMIN UR-MCNC: NORMAL
MONOCYTES # BLD AUTO: 0.5 X10E3/UL (ref 0.1–0.9)
MONOCYTES NFR BLD AUTO: 9 %
NEUTROPHILS # BLD AUTO: 3 X10E3/UL (ref 1.4–7)
NEUTROPHILS NFR BLD AUTO: 47 %
PLATELET # BLD AUTO: 225 X10E3/UL (ref 150–450)
POTASSIUM SERPL-SCNC: 4.5 MMOL/L (ref 3.5–5.2)
PROT SERPL-MCNC: 7.5 G/DL (ref 6–8.5)
RBC # BLD AUTO: 4.73 X10E6/UL (ref 3.77–5.28)
SODIUM SERPL-SCNC: 140 MMOL/L (ref 134–144)
TRIGL SERPL-MCNC: 112 MG/DL (ref 0–149)
TSH SERPL DL<=0.005 MIU/L-ACNC: 1.76 UIU/ML (ref 0.45–4.5)
VLDLC SERPL CALC-MCNC: 20 MG/DL (ref 5–40)
WBC # BLD AUTO: 6.3 X10E3/UL (ref 3.4–10.8)

## 2025-02-21 NOTE — PROGRESS NOTES
"  Subjective   Akila Goodrich is a 60 y.o. female who is here for   Chief Complaint   Patient presents with    Annual Exam   .     History of Present Illness   History of Present Illness  Akila Goodrich presents to the office for annual physical.  Patient does have history of multiple medical problems.  She is currently battling multiple sclerosis.  She is seeing Dr. Duckworth and on a regular basis.  She is currently on Glatopa 40 mg 3 times per week.      Review of Systems   Constitutional:  Negative for activity change, appetite change, chills, fatigue and fever.   Respiratory:  Negative for cough and shortness of breath.    Cardiovascular:  Negative for chest pain and leg swelling.   Gastrointestinal:  Negative for abdominal pain.       Objective   Vitals:    02/20/25 1349   BP: 124/74   BP Location: Left arm   Patient Position: Sitting   Cuff Size: Large Adult   Pulse: 65   Temp: 97 °F (36.1 °C)   SpO2: 98%   Weight: 104 kg (230 lb)   Height: 170.2 cm (67.01\")      Physical Exam  Vitals and nursing note reviewed.   Constitutional:       Appearance: Normal appearance. She is normal weight.   HENT:      Head: Normocephalic and atraumatic.   Cardiovascular:      Rate and Rhythm: Normal rate and regular rhythm.      Pulses: Normal pulses.      Heart sounds: No murmur heard.  Pulmonary:      Effort: Pulmonary effort is normal. No respiratory distress.      Breath sounds: Normal breath sounds. No wheezing.   Skin:     General: Skin is warm and dry.   Neurological:      General: No focal deficit present.      Mental Status: She is alert.   Psychiatric:         Mood and Affect: Mood normal.         Thought Content: Thought content normal.       Physical Exam        Assessment & Plan   Assessment & Plan    Diagnoses and all orders for this visit:    1. Routine general medical examination at a health care facility (Primary  Reviewed health maintenance.  Discussed healthy living diet and exercise.  Patient's next " colonoscopy is due September 2027.  2. Gastroesophageal reflux disease, unspecified whether esophagitis present  Stable at this time.  Patient does have a hiatal hernia.  3. Hiatal hernia  Discussed findings of hiatal hernia on previous imaging.  Discussed if symptoms get worse we will likely need possible surgery to repair.  4. Thyroid nodule  History of thyroid nodule.  Will obtain baseline labs.  -     TSH Rfx On Abnormal To Free T4  -     Lipid Panel  -     CBC & Differential    5. Mixed hyperlipidemia  Will check CMP and lipid panel.  -     TSH Rfx On Abnormal To Free T4  -     Lipid Panel  -     CBC & Differential    6. Multiple sclerosis  Stable.  -     TSH Rfx On Abnormal To Free T4  -     Lipid Panel  -     CBC & Differential    7. Prediabetes  We will check baseline labs.  Recheck hemoglobin A1c.  -     Comprehensive Metabolic Panel  -     Hemoglobin A1c  -     Microalbumin / Creatinine Urine Ratio - Urine, Clean Catch    8. Chronic bilateral low back pain without sciatica  -     meloxicam (MOBIC) 15 MG tablet; Take 1 tablet by mouth Daily.  Dispense: 90 tablet; Refill: 1      Results      There are no Patient Instructions on file for this visit.    Medications Discontinued During This Encounter   Medication Reason    baclofen (LIORESAL) 10 MG tablet Historical Med - Therapy completed    fluticasone (FLONASE) 50 MCG/ACT nasal spray *Therapy completed    Omega-3 Fatty Acids (FISH OIL PO) *Therapy completed    meloxicam (MOBIC) 7.5 MG tablet Reorder        Return in about 6 months (around 8/20/2025), or if symptoms worsen or fail to improve, for Recheck.  Class 2 Severe Obesity (BMI >=35 and <=39.9). Obesity-related health conditions include the following: dyslipidemias. Obesity is improving with lifestyle modifications. BMI is is above average; BMI management plan is completed. We discussed portion control and increasing exercise.      Santhosh Espinosa MD  [facility]  [place], [place]

## 2025-02-21 NOTE — PROGRESS NOTES
CMP reveals normal liver and kidney function.  Hemoglobin A1c is normal at 5.6.  CBC reveals normal white blood cell count, normal hemoglobin, normal platelet count.  Thyroid testing is normal.  Cholesterol reveals mildly elevated LDL.  Patient is to work on diet and exercise.  Continue current medications.

## 2025-02-27 DIAGNOSIS — F33.2 MODERATELY SEVERE RECURRENT MAJOR DEPRESSION: Chronic | ICD-10-CM

## 2025-02-27 RX ORDER — BUPROPION HYDROCHLORIDE 150 MG/1
150 TABLET ORAL DAILY
Qty: 90 TABLET | Refills: 0 | Status: SHIPPED | OUTPATIENT
Start: 2025-02-27

## 2025-03-05 ENCOUNTER — PATIENT MESSAGE (OUTPATIENT)
Dept: FAMILY MEDICINE CLINIC | Facility: CLINIC | Age: 60
End: 2025-03-05
Payer: COMMERCIAL

## 2025-03-06 DIAGNOSIS — M54.50 CHRONIC BILATERAL LOW BACK PAIN WITHOUT SCIATICA: ICD-10-CM

## 2025-03-06 DIAGNOSIS — G35 MS (MULTIPLE SCLEROSIS): Primary | ICD-10-CM

## 2025-03-06 DIAGNOSIS — G89.29 CHRONIC BILATERAL LOW BACK PAIN WITHOUT SCIATICA: ICD-10-CM

## 2025-03-06 RX ORDER — MELOXICAM 7.5 MG/1
7.5 TABLET ORAL DAILY
Qty: 90 TABLET | Refills: 0 | OUTPATIENT
Start: 2025-03-06

## 2025-03-06 RX ORDER — GLATIRAMER 40 MG/ML
40 INJECTION, SOLUTION SUBCUTANEOUS 3 TIMES WEEKLY
Qty: 12 EACH | Refills: 4 | Status: SHIPPED | OUTPATIENT
Start: 2025-03-07

## 2025-03-06 NOTE — TELEPHONE ENCOUNTER
"MEDICATION REFILL REQUEST    Caller: Akila Goodrich JHOAN \"Li\"    Relationship: Self    Best call back number: [phone]    Requested Prescriptions:   Requested Prescriptions     Pending Prescriptions Disp Refills    Glatiramer Acetate (Glatopa) 40 MG/ML solution prefilled syringe 12 each 4     Sig: Inject 40 mg under the skin into the appropriate area as directed 3 (Three) Times a Week.      Pharmacy where request should be sent: [facility] [address] [facility] [phone]  - [phone] FX     Last office visit with prescribing clinician: 1/28/2025   Last telemedicine visit with prescribing clinician: Visit date not found   Next office visit with prescribing clinician: 5/29/2025     Additional details provided by patient: PT STATES [facility] ([facility]) SHOWS NO REFILLS OF GLATOPA ON FILE. THEY ADVISED PT TO REACH OUT TO REQUEST REFILL(S) AND ALSO ADVISED THAT THE GLATOPA RX WILL REQUIRE AN UPDATED PRIOR AUTHORIZATION AS THE EXISTING PA ON FILE IS FROM DR. ALVARADO.    Does the patient have less than a 3 day supply?:  [] Yes  [x] No    Would you like a call back once the refill request has been completed?: [] Yes  [x] No    If the office needs to give you a call back, can they leave a voicemail?: [] Yes  [x] No    PLEASE REVIEW AND ADVISE.    Rachel Potter Rep   03/06/25 11:05 EST   "

## 2025-03-06 NOTE — TELEPHONE ENCOUNTER
I have been taking  the 15 mg of Meloxicam, the pain is the same there is no difference.  Can we try Diclofenac?    something else or add on a pain pill?     Thank you   Li

## 2025-03-07 ENCOUNTER — TELEPHONE (OUTPATIENT)
Dept: NEUROLOGY | Facility: CLINIC | Age: 60
End: 2025-03-07
Payer: COMMERCIAL

## 2025-03-07 NOTE — TELEPHONE ENCOUNTER
"Caller: Akila Goodrich \"Li\"    Relationship: Self    Best call back number: [phone]    What was the call regarding?: PT CALLED TO ADVISE SHE RECEIVED NOTIFICATION FROM [facility] STATING THE EXISTING PRIOR AUTHORIZATION (WHEN RX WAS BEING FILLED BY DR. ALVARADO) IS VALID FROM 3/21/2024-3/21/2025. NEW PA IS NEEDED AS EXPIRATION DATE IS SOON AND RX IS NOW BEING PRESCRIBED BY DR. OCHOA.    PT STATES OFFICE CAN CALL [facility] TO INTIATE PRIOR AUTHORIZATION; PLEASE CALL [phone].    Do you require a callback?: YES, PLEASE.    PLEASE REVIEW AND ADVISE.  "

## 2025-03-24 ENCOUNTER — SPECIALTY PHARMACY (OUTPATIENT)
Dept: INFUSION THERAPY | Facility: HOSPITAL | Age: 60
End: 2025-03-24
Payer: COMMERCIAL

## 2025-05-13 ENCOUNTER — OFFICE VISIT (OUTPATIENT)
Dept: OBSTETRICS AND GYNECOLOGY | Facility: CLINIC | Age: 60
End: 2025-05-13
Payer: COMMERCIAL

## 2025-05-13 VITALS
SYSTOLIC BLOOD PRESSURE: 126 MMHG | WEIGHT: 233.25 LBS | BODY MASS INDEX: 36.61 KG/M2 | DIASTOLIC BLOOD PRESSURE: 76 MMHG | HEIGHT: 67 IN

## 2025-05-13 DIAGNOSIS — N32.81 OVERACTIVE BLADDER: ICD-10-CM

## 2025-05-13 DIAGNOSIS — K21.9 GASTROESOPHAGEAL REFLUX DISEASE, UNSPECIFIED WHETHER ESOPHAGITIS PRESENT: ICD-10-CM

## 2025-05-13 DIAGNOSIS — Z12.11 ENCOUNTER FOR SCREENING FOR MALIGNANT NEOPLASM OF COLON: ICD-10-CM

## 2025-05-13 DIAGNOSIS — Z11.51 SPECIAL SCREENING EXAMINATION FOR HUMAN PAPILLOMAVIRUS (HPV): ICD-10-CM

## 2025-05-13 DIAGNOSIS — Z12.31 SCREENING MAMMOGRAM FOR BREAST CANCER: ICD-10-CM

## 2025-05-13 DIAGNOSIS — F33.41 RECURRENT MAJOR DEPRESSIVE DISORDER, IN PARTIAL REMISSION: ICD-10-CM

## 2025-05-13 DIAGNOSIS — Z80.0 FAMILY HISTORY OF COLON CANCER: ICD-10-CM

## 2025-05-13 DIAGNOSIS — Z15.01 BRCA1 GENE MUTATION POSITIVE: ICD-10-CM

## 2025-05-13 DIAGNOSIS — Z84.81 FAMILY HISTORY OF BRCA GENE POSITIVE: ICD-10-CM

## 2025-05-13 DIAGNOSIS — Z01.419 ROUTINE GYNECOLOGICAL EXAMINATION: ICD-10-CM

## 2025-05-13 DIAGNOSIS — Z01.419 CERVICAL SMEAR, AS PART OF ROUTINE GYNECOLOGICAL EXAMINATION: Primary | ICD-10-CM

## 2025-05-13 DIAGNOSIS — Z15.09 BRCA1 GENE MUTATION POSITIVE: ICD-10-CM

## 2025-05-13 RX ORDER — TRIAMCINOLONE ACETONIDE 1 MG/G
OINTMENT TOPICAL
COMMUNITY
Start: 2025-03-20

## 2025-05-13 RX ORDER — MIRABEGRON 25 MG/1
25 TABLET, FILM COATED, EXTENDED RELEASE ORAL DAILY
Qty: 30 TABLET | Refills: 11 | Status: SHIPPED | OUTPATIENT
Start: 2025-05-13 | End: 2026-05-13

## 2025-05-13 NOTE — PROGRESS NOTES
GYN Annual Exam     CC- Here for annual exam   Chief Complaint   Patient presents with    Gynecologic Exam          Akila Goodrich is a 60 y.o.  female who presents for annual well woman exam. No LMP recorded (lmp unknown). Patient is postmenopausal.    Problems in addition to need for annual: pt states her vesicare is not working.  Would like to increase the dose.    HPI: History of Present Illness    PMHX:  Patient Active Problem List   Diagnosis    Multiple sclerosis    Polyarthralgia    Recurrent major depressive disorder, in partial remission    Psychophysiological insomnia    Gastroesophageal reflux disease    Thyroid nodule    Paraesophageal hernia    Overactive bladder    Hyperlipidemia    Family history of colon cancer    Family history of BRCA gene positive    Dysphagia    Chronic bilateral low back pain without sciatica    BRCA1 gene mutation positive    Encounter for screening for malignant neoplasm of colon   ; otherwise none    OB History          4    Para   3    Term   3            AB   1    Living   3         SAB   1    IAB        Ectopic        Molar        Multiple        Live Births   3                Past Medical History:   Diagnosis Date    Allergic     Anxiety     Arthritis     Depression     Fibromyalgia, primary     GERD (gastroesophageal reflux disease)     Urinary tract infection        Past Surgical History:   Procedure Laterality Date    COLONOSCOPY      COLONOSCOPY N/A 2024    Procedure: COLONOSCOPY TO CECUM;  Surgeon: Clayton Vallejo MD;  Location: [facility] MAIN OR;  Service: Gastroenterology;  Laterality: N/A;  POOR PREP    TUBAL ABDOMINAL LIGATION           Current Outpatient Medications:     buPROPion XL (WELLBUTRIN XL) 150 MG 24 hr tablet, Take 1 tablet by mouth once daily, Disp: 90 tablet, Rfl: 0    cetirizine (zyrTEC) 10 MG tablet, Take 1 tablet by mouth Daily., Disp: , Rfl:     Cyanocobalamin (VITAMIN B 12 PO), Take  by mouth Daily., Disp: , Rfl:      diclofenac (VOLTAREN) 50 MG EC tablet, Take 1 tablet by mouth 2 (Two) Times a Day., Disp: 60 tablet, Rfl: 1    Glatiramer Acetate (Glatopa) 40 MG/ML solution prefilled syringe, Inject 40 mg under the skin into the appropriate area as directed 3 (Three) Times a Week., Disp: 12 each, Rfl: 4    Magnesium 400 MG tablet, Take 400 mg by mouth Daily., Disp: , Rfl:     omeprazole (priLOSEC) 20 MG capsule, Take 1 capsule by mouth Daily., Disp: 90 capsule, Rfl: 1    POTASSIUM PO, Take  by mouth., Disp: , Rfl:     solifenacin (VESIcare) 10 MG tablet, Take 1 tablet by mouth Daily., Disp: 90 tablet, Rfl: 2    triamcinolone (KENALOG) 0.1 % ointment, APPLY TO AFFECTED AREA 1-2 TIMES A DAY AS NEEDED, Disp: , Rfl:     vitamin C (ASCORBIC ACID) 500 MG tablet, Take 1 tablet by mouth Daily., Disp: , Rfl:     Vitamin D, Cholecalciferol, (CHOLECALCIFEROL) 10 MCG (400 UNIT) tablet, Take 1 tablet by mouth Daily., Disp: , Rfl:     No Known Allergies    Social History     Tobacco Use    Smoking status: Former     Current packs/day: 0.00     Average packs/day: 0.5 packs/day for 18.0 years (9.0 ttl pk-yrs)     Types: Cigarettes     Start date: 1986     Quit date: 2004     Years since quittin.3     Passive exposure: Past    Smokeless tobacco: Never   Vaping Use    Vaping status: Never Used   Substance Use Topics    Alcohol use: Yes     Alcohol/week: 3.0 standard drinks of alcohol     Types: 2 Glasses of wine, 1 Drinks containing 0.5 oz of alcohol per week     Comment: Rarely    Drug use: Never       Akila Goodrich  reports that she quit smoking about 21 years ago. Her smoking use included cigarettes. She started smoking about 39 years ago. She has a 9 pack-year smoking history. She has been exposed to tobacco smoke. She has never used smokeless tobacco..       Family History   Problem Relation Age of Onset    Colon cancer Mother     Cancer Mother 51        Colon    Diabetes Mother     Hyperlipidemia Mother     Thyroid disease  "Mother     Depression Mother     Breast cancer Sister         55    Cancer Sister         Breast    Breast cancer Sister         50's    Ovarian cancer Sister         59    Thyroid disease Sister     Cancer Sister         Breast    Diabetes Maternal Grandmother     Hypertension Maternal Grandmother     Cancer Maternal Grandfather 40        Liver    Stroke Paternal Grandfather        Review of Systems    Patient reports that she is currently experiencing any symptoms of urinary incontinence.      noTESTED FOR CHLAMYDIA?    Gardisil indicated? (9-46yo) 0,2,6 months: no    EXAM:  /76   Ht 170.2 cm (67.01\")   Wt 106 kg (233 lb 4 oz)   LMP  (LMP Unknown)   Breastfeeding No   BMI 36.52 kg/m²     Physical Exam  Vitals and nursing note reviewed. Exam conducted with a chaperone present.   Constitutional:       General: She is not in acute distress.     Appearance: She is well-developed. She is not diaphoretic.   HENT:      Head: Normocephalic and atraumatic.      Nose: Nose normal.   Eyes:      Extraocular Movements: Extraocular movements intact.   Cardiovascular:      Rate and Rhythm: Normal rate.   Pulmonary:      Effort: Pulmonary effort is normal.   Chest:   Breasts:     Breasts are symmetrical.      Right: Normal. No mass, nipple discharge, skin change or tenderness.      Left: Normal. No mass, nipple discharge, skin change or tenderness.   Abdominal:      General: There is no distension.      Palpations: Abdomen is soft. There is no mass.      Tenderness: There is no abdominal tenderness. There is no guarding.      Hernia: There is no hernia in the left inguinal area or right inguinal area.   Genitourinary:     General: Normal vulva.      Exam position: Lithotomy position.      Pubic Area: No rash.       Labia:         Right: No rash, tenderness, lesion or injury.         Left: No rash, tenderness, lesion or injury.       Urethra: No prolapse, urethral swelling or urethral lesion.      Vagina: Normal. No " signs of injury and foreign body. No vaginal discharge, erythema, tenderness, bleeding, lesions or prolapsed vaginal walls.      Cervix: Normal. No cervical motion tenderness, discharge, friability, lesion, erythema, cervical bleeding or eversion.      Uterus: Normal. Not deviated, not enlarged, not fixed, not tender and no uterine prolapse.       Adnexa: Right adnexa normal and left adnexa normal.        Right: No mass, tenderness or fullness.          Left: No mass, tenderness or fullness.        Rectum: No anal fissure or external hemorrhoid.   Musculoskeletal:         General: No tenderness or deformity. Normal range of motion.      Cervical back: Normal range of motion.   Lymphadenopathy:      Upper Body:      Right upper body: No axillary adenopathy.      Left upper body: No axillary adenopathy.      Lower Body: No right inguinal adenopathy. No left inguinal adenopathy.   Skin:     General: Skin is warm and dry.      Coloration: Skin is not pale.      Findings: No erythema or rash.   Neurological:      Mental Status: She is alert and oriented to person, place, and time.   Psychiatric:         Behavior: Behavior normal.         Thought Content: Thought content normal.         Judgment: Judgment normal.         Labs:   Lab Results (last 24 hours)       ** No results found for the last 24 hours. **                As part of wellness and prevention, the following topics were discussed with the patient where appropriate: healthy weight, substance abuse/misuse, mental health, encouraging self breast exam, and other counseling and guidance done:  Nutrition, physical activity, healthy weight, injury prevention, misuse of tobacco, alcohol and drugs      Mammogram:   Last Completed Mammogram            Completed or No Longer Recommended       MAMMOGRAM  Discontinued        Frequency changed to Never automatically (Topic No Longer Applies)    05/12/2025  Order placed for Mammo Screening Digital Tomosynthesis Bilateral  With CAD by Meir, Jd Welch MD    06/28/2024  Mammo Screening Digital Tomosynthesis Bilateral With CAD    06/16/2023  Mammo Screening Digital Tomosynthesis Bilateral With CAD    05/23/2022  MAMMO SCREENING DIGITAL TOMOSYNTHESIS BILATERAL W CAD     Only the first 5 history entries have been loaded, but more history exists.                        MAMMOGRAM UP TO DATE IF AGE APPROPRIATE?  No  (if no, pt encouraged to schedule; risks of undiagnosed breast cancer reviewed with pt if she does not have a mammogram)    Colonoscopy:   Last Completed Colonoscopy            Needs Review       COLORECTAL CANCER SCREENING (COLONOSCOPY - Every 3 Years) Tentatively due on 9/24/2027 09/24/2024  Surgical Procedure: COLONOSCOPY    09/24/2024  Colonoscopy    12/09/2019  COLONOSCOPY (Done)    12/09/2019  COLONOSCOPY (Done)                          COLONOSCOPY UP TO DATE IF AGE APPROPRIATE? Yes  (if no, risks of undiagnosed colon cancer reviewed with pt if she fails to have the colonoscopy)      Assessment/Plan:     1) GYN annual well woman exam.   2) PAP done today?   3) problems addressed:     * No active hospital problems. *            Follow up prn or one year.    Pt instructed to call for results of any testing done today and that failure to call if she has not heard from us could result in inadequate treatment.  Pt verbalized her understanding.     Diagnoses and all orders for this visit:    1. Cervical smear, as part of routine gynecological examination (Primary)  -     IGP, Apt HPV,rfx 16 / 18,45    2. Encounter for screening for malignant neoplasm of colon    3. Overactive bladder    4. Family history of BRCA gene positive    5. BRCA1 gene mutation positive  Overview:  Formatting of this note might be different from the original.  P.*      6. Recurrent major depressive disorder, in partial remission    7. Gastroesophageal reflux disease, unspecified whether esophagitis present    8. Family history of  colon cancer  Overview:  Formatting of this note might be different from the original.  Added automatically from request for surgery [id number]  Formatting of this note might be different from the original.  Added automatically from request for surgery [id number]      9. Screening mammogram for breast cancer  -     Mammo Screening Digital Tomosynthesis Bilateral With CAD; Future    10. Routine gynecological examination  -     POC Urinalysis Dipstick  -     IGP, Apt HPV,rfx 16 / 18,45    11. Special screening examination for human papillomavirus (HPV)  -     IGP, Apt HPV,rfx 16 / 18,45           RTO Return in about 1 year (around 5/13/2026) for Annual physical.      Jd Worley MD  05/13/25  09:35 EDT

## 2025-05-15 LAB
CYTOLOGIST CVX/VAG CYTO: NORMAL
CYTOLOGY CVX/VAG DOC CYTO: NORMAL
CYTOLOGY CVX/VAG DOC THIN PREP: NORMAL
DX ICD CODE: NORMAL
HPV I/H RISK 4 DNA CVX QL PROBE+SIG AMP: NEGATIVE
OTHER STN SPEC: NORMAL
SERVICE CMNT-IMP: NORMAL
STAT OF ADQ CVX/VAG CYTO-IMP: NORMAL

## 2025-05-31 DIAGNOSIS — F33.2 MODERATELY SEVERE RECURRENT MAJOR DEPRESSION: Chronic | ICD-10-CM

## 2025-06-02 RX ORDER — BUPROPION HYDROCHLORIDE 150 MG/1
150 TABLET ORAL DAILY
Qty: 90 TABLET | Refills: 0 | Status: SHIPPED | OUTPATIENT
Start: 2025-06-02

## 2025-06-03 ENCOUNTER — HOSPITAL ENCOUNTER (OUTPATIENT)
Dept: MAMMOGRAPHY | Facility: HOSPITAL | Age: 60
Discharge: HOME | End: 2025-06-03
Admitting: OBSTETRICS & GYNECOLOGY
Payer: COMMERCIAL

## 2025-06-03 DIAGNOSIS — Z12.31 SCREENING MAMMOGRAM FOR BREAST CANCER: ICD-10-CM

## 2025-06-03 PROCEDURE — 77063 BREAST TOMOSYNTHESIS BI: CPT

## 2025-06-03 PROCEDURE — 77067 SCR MAMMO BI INCL CAD: CPT

## 2025-06-03 PROCEDURE — 77067 SCR MAMMO BI INCL CAD: CPT | Performed by: RADIOLOGY

## 2025-06-03 PROCEDURE — 77063 BREAST TOMOSYNTHESIS BI: CPT | Performed by: RADIOLOGY

## 2025-06-12 ENCOUNTER — OFFICE VISIT (OUTPATIENT)
Dept: NEUROLOGY | Facility: CLINIC | Age: 60
End: 2025-06-12
Payer: COMMERCIAL

## 2025-06-12 ENCOUNTER — LAB (OUTPATIENT)
Dept: LAB | Facility: HOSPITAL | Age: 60
End: 2025-06-12
Payer: MEDICARE

## 2025-06-12 VITALS
OXYGEN SATURATION: 98 % | DIASTOLIC BLOOD PRESSURE: 90 MMHG | BODY MASS INDEX: 36.17 KG/M2 | WEIGHT: 231 LBS | HEART RATE: 85 BPM | SYSTOLIC BLOOD PRESSURE: 140 MMHG

## 2025-06-12 DIAGNOSIS — G35 MS (MULTIPLE SCLEROSIS): Primary | ICD-10-CM

## 2025-06-12 DIAGNOSIS — G35 MS (MULTIPLE SCLEROSIS): ICD-10-CM

## 2025-06-12 LAB
ALBUMIN SERPL-MCNC: 4.3 G/DL (ref 3.5–5.2)
ALP SERPL-CCNC: 89 U/L (ref 39–117)
ALT SERPL W P-5'-P-CCNC: 16 U/L (ref 1–33)
AST SERPL-CCNC: 25 U/L (ref 1–32)
BASOPHILS # BLD AUTO: 0.14 10*3/MM3 (ref 0–0.2)
BASOPHILS NFR BLD AUTO: 2.2 % (ref 0–1.5)
BILIRUB CONJ SERPL-MCNC: 0.2 MG/DL (ref 0–0.3)
BILIRUB INDIRECT SERPL-MCNC: 0.2 MG/DL
BILIRUB SERPL-MCNC: 0.4 MG/DL (ref 0–1.2)
DEPRECATED RDW RBC AUTO: 50.6 FL (ref 37–54)
EOSINOPHIL # BLD AUTO: 0.18 10*3/MM3 (ref 0–0.4)
EOSINOPHIL NFR BLD AUTO: 2.9 % (ref 0.3–6.2)
ERYTHROCYTE [DISTWIDTH] IN BLOOD BY AUTOMATED COUNT: 14.5 % (ref 12.3–15.4)
HCT VFR BLD AUTO: 45.2 % (ref 34–46.6)
HGB BLD-MCNC: 13.9 G/DL (ref 12–15.9)
HIV 1+2 AB+HIV1 P24 AG SERPL QL IA: NORMAL
IMM GRANULOCYTES # BLD AUTO: 0.01 10*3/MM3 (ref 0–0.05)
IMM GRANULOCYTES NFR BLD AUTO: 0.2 % (ref 0–0.5)
LYMPHOCYTES # BLD AUTO: 2.63 10*3/MM3 (ref 0.7–3.1)
LYMPHOCYTES NFR BLD AUTO: 41.7 % (ref 19.6–45.3)
MCH RBC QN AUTO: 29.6 PG (ref 26.6–33)
MCHC RBC AUTO-ENTMCNC: 30.8 G/DL (ref 31.5–35.7)
MCV RBC AUTO: 96.2 FL (ref 79–97)
MONOCYTES # BLD AUTO: 0.57 10*3/MM3 (ref 0.1–0.9)
MONOCYTES NFR BLD AUTO: 9 % (ref 5–12)
NEUTROPHILS NFR BLD AUTO: 2.77 10*3/MM3 (ref 1.7–7)
NEUTROPHILS NFR BLD AUTO: 44 % (ref 42.7–76)
NRBC BLD AUTO-RTO: 0 /100 WBC (ref 0–0.2)
PLATELET # BLD AUTO: 242 10*3/MM3 (ref 140–450)
PMV BLD AUTO: 10.8 FL (ref 6–12)
PROT SERPL-MCNC: 8 G/DL (ref 6–8.5)
RBC # BLD AUTO: 4.7 10*6/MM3 (ref 3.77–5.28)
WBC NRBC COR # BLD AUTO: 6.3 10*3/MM3 (ref 3.4–10.8)

## 2025-06-12 PROCEDURE — G0432 EIA HIV-1/HIV-2 SCREEN: HCPCS

## 2025-06-12 PROCEDURE — 86705 HEP B CORE ANTIBODY IGM: CPT

## 2025-06-12 PROCEDURE — 99214 OFFICE O/P EST MOD 30 MIN: CPT | Performed by: PSYCHIATRY & NEUROLOGY

## 2025-06-12 PROCEDURE — 86803 HEPATITIS C AB TEST: CPT

## 2025-06-12 PROCEDURE — 80076 HEPATIC FUNCTION PANEL: CPT

## 2025-06-12 PROCEDURE — 87340 HEPATITIS B SURFACE AG IA: CPT

## 2025-06-12 PROCEDURE — 85025 COMPLETE CBC W/AUTO DIFF WBC: CPT

## 2025-06-12 PROCEDURE — 86707 HEPATITIS BE ANTIBODY: CPT

## 2025-06-12 PROCEDURE — 86480 TB TEST CELL IMMUN MEASURE: CPT

## 2025-06-12 PROCEDURE — 36415 COLL VENOUS BLD VENIPUNCTURE: CPT

## 2025-06-12 PROCEDURE — 87350 HEPATITIS BE AG IA: CPT

## 2025-06-12 NOTE — PROGRESS NOTES
Notes by PRISCILLA:  Ms Goodrich is here with her  today for a follow up regarding MS.      Subjective:     Patient ID: Akila Goodrich is a 60 y.o. female.    History of Present Illness  The following portions of the patient's history were reviewed and updated as appropriate: allergies, past family history, past medical history, past social history, past surgical history, and problem list.    History of Present Illness  The patient is a 60-year-old woman here for follow-up of multiple sclerosis.    She reports no new or different symptoms and has been managing her condition well. She has been on a regimen of injections for over 20 years, which she tolerates without any issues. She rotates the injection sites between her legs and abdomen, and initially had some difficulty adjusting to the needle depth but has since acclimated. Her last MS flare-up occurred in 1997.    She has been experiencing long-term fatigue and is open to trying Provigil or Nuvigil, neither of which she has previously used.    She recently obtained Medicare coverage and is seeking physical therapy for strengthening and balance exercises. She plans to continue using her current insurance as secondary coverage.    She expresses interest in transitioning to an oral medication that would require a 2-year course, after which she could discontinue treatment. She is scheduled to travel on 06/14/2025 to be with her daughter, who is expecting a baby, and will return on 08/02/2025.    She has completed all necessary cancer screenings, including a mammogram last month, a Pap smear, and a colonoscopy in 2024.    INTERVAL: Since last visit, she reports no new or different symptoms and has been managing her condition well.    MEDICATIONS  CURRENT MEDS:  Glatopa Injection    Review of Systems   Constitutional:  Positive for fatigue.   Musculoskeletal:  Positive for gait problem.      Objective:    Neurological Exam   She is awake alert pleasant and cooperative.   Speech is clear and fluent to speech comprehension is reasonable.  She has a normal social demeanor.     Cranial nerves II through XII normal and symmetric.     Motor exam reveals no drift or lateralized spasticity.  Tone and bulk are reasonable.     Sensory exam reveals reduced temperature and light touch sensation of the distal lower extremity on the right.     Gait is little wide-based.  Finger-nose-finger is reasonably accurate.       Tendon reflexes are 2-3+ and reasonably symmetric.  Physical Exam    Assessment/Plan:     Diagnoses and all orders for this visit:    1. MS (multiple sclerosis) (Primary)  -     Ambulatory Referral to Physical Therapy for Evaluation & Treatment  -     CBC & Differential; Future  -     Hepatic Function Panel; Future  -     HIV-1 & HIV-2 Antibodies; Future  -     HBcIgM+HBeAb+HBeAg+HBsAg; Future  -     HCV Antibody Rfx To Qnt PCR; Future  -     QuantiFERON-TB Gold Plus; Future  -     MRI Brain With & Without Contrast; Future         Assessment & Plan  1. Multiple Sclerosis.  She has not experienced an MS flare-up since 1997. She is currently tolerating her glatiramer injections well and rotating the injection sites between her legs and abdomen. The potential transition to Mavenclad was discussed, including its benefits and risks, including risk of cancer. She expressed interest in this treatment option.  She has already completed her routine cancer screening including a recent colonoscopy, recent Pap smear, and mammogram.  Recent blood work will be ordered to ensure she is a suitable candidate for Mavenclad, including tests for TB and other relevant markers. If the blood work results are satisfactory, the transition to Mavenclad will be initiated in the fall.  For her fatigue, which is an ongoing issue, we will try to get her started on Provigil.      2. Physical Therapy.  A referral for physical will be made to address her needs for strengthening and balance exercises in the setting  of her multiple sclerosis.      Follow-up  The patient will follow up in 2 months.    Patient or patient representative verbalized consent for the use of Ambient Listening during the visit with  Trent Randall MD for chart documentation. 6/13/2025  07:33 EDT

## 2025-06-13 ENCOUNTER — SPECIALTY PHARMACY (OUTPATIENT)
Dept: INFUSION THERAPY | Facility: HOSPITAL | Age: 60
End: 2025-06-13
Payer: MEDICARE

## 2025-06-13 ENCOUNTER — TELEPHONE (OUTPATIENT)
Dept: NEUROLOGY | Facility: CLINIC | Age: 60
End: 2025-06-13
Payer: MEDICARE

## 2025-06-13 RX ORDER — MODAFINIL 200 MG/1
200 TABLET ORAL DAILY
Qty: 30 TABLET | Refills: 5 | Status: SHIPPED | OUTPATIENT
Start: 2025-06-13

## 2025-06-13 NOTE — TELEPHONE ENCOUNTER
"Provider: DR OCHOA    Caller: Akila Goodrich \"Li\"    Relationship to Patient: Self    Phone Number: [phone]    Reason for Call: STATES MRI WAS APPROVED BUT NEEDS AN EXTENSION. SHE WILL BE OUT OF TOWN UNTIL 8/2/25. PLEASE CALL INS # [phone], CASE# [id number]. PLEASE REVIEW, THANK YOU.  "

## 2025-06-14 LAB
GAMMA INTERFERON BACKGROUND BLD IA-ACNC: 0.04 IU/ML
HBV CORE IGM SERPL QL IA: NEGATIVE
HBV E AB SERPL QL IA: NON REACTIVE
HBV E AG SERPL QL IA: NEGATIVE
HBV SURFACE AG SERPL QL IA: NEGATIVE
HCV AB SERPL QL IA: NON REACTIVE
HCV AB SERPL QL IA: NORMAL
M TB IFN-G BLD-IMP: NEGATIVE
M TB IFN-G CD4+ BCKGRND COR BLD-ACNC: 0.04 IU/ML
M TB IFN-G CD4+CD8+ BCKGRND COR BLD-ACNC: 0.03 IU/ML
MITOGEN IGNF BCKGRD COR BLD-ACNC: >10 IU/ML
QUANTIFERON INCUBATION: NORMAL
SERVICE CMNT-IMP: NORMAL

## 2025-06-18 ENCOUNTER — SPECIALTY PHARMACY (OUTPATIENT)
Dept: INFUSION THERAPY | Facility: HOSPITAL | Age: 60
End: 2025-06-18
Payer: MEDICARE

## 2025-06-18 NOTE — PROGRESS NOTES
[facility]ty Pharmacy Patient Management Program  Refill Outreach     Modafinil PA approved until 6/13/26 [id number]  Maribell Ruffin, Pharmacy Technician  6/18/2025  08:21 EDT

## 2025-06-25 ENCOUNTER — TELEPHONE (OUTPATIENT)
Dept: NEUROLOGY | Facility: CLINIC | Age: 60
End: 2025-06-25
Payer: MEDICARE

## 2025-06-25 ENCOUNTER — SPECIALTY PHARMACY (OUTPATIENT)
Dept: INFUSION THERAPY | Facility: HOSPITAL | Age: 60
End: 2025-06-25
Payer: MEDICARE

## 2025-06-25 NOTE — TELEPHONE ENCOUNTER
Provider: DR OCHOA    Caller: ALTA WITH [employer]    Phone Number: [phone] OPTION 2    Reason for Call: REQUESTING A CALL BACK REGARDING CLINICAL QUESTIONS FOR GLATOPA. STATES THEY ARE UNABLE TO DISCLOSE THE QUESTIONS EXCEPT TO A CLINICAL STAFF MEMBER. PLEASE REVIEW, THANK YOU.

## 2025-07-15 ENCOUNTER — TELEPHONE (OUTPATIENT)
Dept: NEUROLOGY | Facility: CLINIC | Age: 60
End: 2025-07-15
Payer: MEDICARE

## 2025-07-15 NOTE — TELEPHONE ENCOUNTER
Called patient but there was no answer and VM not set up. Dr. Randall will be out of the office on 8/14, so we need to get appointment rescheduled. We have openings on 8/7, 8/8 and 8/11. Please reschedule appointment when patient calls back

## 2025-07-24 ENCOUNTER — TELEPHONE (OUTPATIENT)
Dept: NEUROLOGY | Facility: CLINIC | Age: 60
End: 2025-07-24
Payer: MEDICARE

## 2025-08-05 DIAGNOSIS — G35 MS (MULTIPLE SCLEROSIS): ICD-10-CM

## 2025-08-07 ENCOUNTER — SPECIALTY PHARMACY (OUTPATIENT)
Dept: INFUSION THERAPY | Facility: HOSPITAL | Age: 60
End: 2025-08-07
Payer: MEDICARE

## 2025-08-07 ENCOUNTER — OFFICE VISIT (OUTPATIENT)
Dept: NEUROLOGY | Facility: CLINIC | Age: 60
End: 2025-08-07
Payer: MEDICARE

## 2025-08-07 DIAGNOSIS — G35 MS (MULTIPLE SCLEROSIS): Primary | ICD-10-CM

## 2025-08-07 RX ORDER — CLADRIBINE 10 MG/1
10 TABLET ORAL DAILY
Qty: 10 EACH | Refills: 0 | Status: SHIPPED | OUTPATIENT
Start: 2025-08-07

## 2025-08-07 RX ORDER — CLADRIBINE 10 MG/1
1 TABLET ORAL DAILY
Qty: 9 EACH | Refills: 0 | Status: SHIPPED | OUTPATIENT
Start: 2025-08-07

## 2025-08-08 ENCOUNTER — SPECIALTY PHARMACY (OUTPATIENT)
Dept: INFUSION THERAPY | Facility: HOSPITAL | Age: 60
End: 2025-08-08
Payer: MEDICARE

## 2025-08-10 DIAGNOSIS — F33.2 MODERATELY SEVERE RECURRENT MAJOR DEPRESSION: Chronic | ICD-10-CM

## 2025-08-11 ENCOUNTER — SPECIALTY PHARMACY (OUTPATIENT)
Dept: INFUSION THERAPY | Facility: HOSPITAL | Age: 60
End: 2025-08-11
Payer: MEDICARE

## 2025-08-11 RX ORDER — BUPROPION HYDROCHLORIDE 150 MG/1
150 TABLET ORAL DAILY
Qty: 90 TABLET | Refills: 0 | OUTPATIENT
Start: 2025-08-11

## 2025-08-13 ENCOUNTER — SPECIALTY PHARMACY (OUTPATIENT)
Dept: INFUSION THERAPY | Facility: HOSPITAL | Age: 60
End: 2025-08-13
Payer: MEDICARE

## 2025-08-13 DIAGNOSIS — F33.2 MODERATELY SEVERE RECURRENT MAJOR DEPRESSION: Chronic | ICD-10-CM

## 2025-08-13 RX ORDER — BUPROPION HYDROCHLORIDE 150 MG/1
150 TABLET ORAL DAILY
Qty: 90 TABLET | Refills: 0 | OUTPATIENT
Start: 2025-08-13

## 2025-08-15 ENCOUNTER — TREATMENT (OUTPATIENT)
Dept: PHYSICAL THERAPY | Facility: CLINIC | Age: 60
End: 2025-08-15
Payer: MEDICARE

## 2025-08-15 DIAGNOSIS — R26.9 GAIT DISTURBANCE: Primary | ICD-10-CM

## 2025-08-15 DIAGNOSIS — R26.89 BALANCE PROBLEMS: ICD-10-CM

## 2025-08-15 DIAGNOSIS — R29.898 MUSCULAR DECONDITIONING: ICD-10-CM

## 2025-08-15 PROCEDURE — 97112 NEUROMUSCULAR REEDUCATION: CPT | Performed by: PHYSICAL THERAPIST

## 2025-08-15 PROCEDURE — 97110 THERAPEUTIC EXERCISES: CPT | Performed by: PHYSICAL THERAPIST

## 2025-08-15 PROCEDURE — 97530 THERAPEUTIC ACTIVITIES: CPT | Performed by: PHYSICAL THERAPIST

## 2025-08-15 PROCEDURE — 97162 PT EVAL MOD COMPLEX 30 MIN: CPT | Performed by: PHYSICAL THERAPIST

## 2025-08-18 ENCOUNTER — TREATMENT (OUTPATIENT)
Dept: PHYSICAL THERAPY | Facility: CLINIC | Age: 60
End: 2025-08-18
Payer: MEDICARE

## 2025-08-18 DIAGNOSIS — R29.898 MUSCULAR DECONDITIONING: ICD-10-CM

## 2025-08-18 DIAGNOSIS — R26.89 BALANCE PROBLEMS: ICD-10-CM

## 2025-08-18 DIAGNOSIS — R26.9 GAIT DISTURBANCE: Primary | ICD-10-CM

## 2025-08-18 DIAGNOSIS — K21.9 GASTROESOPHAGEAL REFLUX DISEASE, UNSPECIFIED WHETHER ESOPHAGITIS PRESENT: ICD-10-CM

## 2025-08-18 PROCEDURE — 97112 NEUROMUSCULAR REEDUCATION: CPT | Performed by: PHYSICAL THERAPIST

## 2025-08-18 PROCEDURE — 97110 THERAPEUTIC EXERCISES: CPT | Performed by: PHYSICAL THERAPIST

## 2025-08-18 RX ORDER — OMEPRAZOLE 20 MG/1
20 CAPSULE, DELAYED RELEASE ORAL DAILY
Qty: 90 CAPSULE | Refills: 0 | Status: SHIPPED | OUTPATIENT
Start: 2025-08-18 | End: 2025-08-22 | Stop reason: SDUPTHER

## 2025-08-20 ENCOUNTER — TREATMENT (OUTPATIENT)
Dept: PHYSICAL THERAPY | Facility: CLINIC | Age: 60
End: 2025-08-20
Payer: MEDICARE

## 2025-08-20 DIAGNOSIS — R26.89 BALANCE PROBLEMS: ICD-10-CM

## 2025-08-20 DIAGNOSIS — R26.9 GAIT DISTURBANCE: Primary | ICD-10-CM

## 2025-08-20 DIAGNOSIS — R29.898 MUSCULAR DECONDITIONING: ICD-10-CM

## 2025-08-22 ENCOUNTER — OFFICE VISIT (OUTPATIENT)
Dept: FAMILY MEDICINE CLINIC | Facility: CLINIC | Age: 60
End: 2025-08-22
Payer: COMMERCIAL

## 2025-08-22 VITALS
SYSTOLIC BLOOD PRESSURE: 108 MMHG | HEART RATE: 86 BPM | BODY MASS INDEX: 36.26 KG/M2 | HEIGHT: 67 IN | TEMPERATURE: 97.6 F | WEIGHT: 231 LBS | OXYGEN SATURATION: 98 % | DIASTOLIC BLOOD PRESSURE: 76 MMHG

## 2025-08-22 DIAGNOSIS — F33.2 MODERATELY SEVERE RECURRENT MAJOR DEPRESSION: Chronic | ICD-10-CM

## 2025-08-22 DIAGNOSIS — M25.50 POLYARTHRALGIA: ICD-10-CM

## 2025-08-22 DIAGNOSIS — E78.2 MIXED HYPERLIPIDEMIA: ICD-10-CM

## 2025-08-22 DIAGNOSIS — N32.81 OVERACTIVE BLADDER: ICD-10-CM

## 2025-08-22 DIAGNOSIS — R73.03 PREDIABETES: ICD-10-CM

## 2025-08-22 DIAGNOSIS — K21.9 GASTROESOPHAGEAL REFLUX DISEASE, UNSPECIFIED WHETHER ESOPHAGITIS PRESENT: Primary | ICD-10-CM

## 2025-08-22 DIAGNOSIS — G35 MULTIPLE SCLEROSIS: ICD-10-CM

## 2025-08-22 DIAGNOSIS — K21.9 GASTROESOPHAGEAL REFLUX DISEASE, UNSPECIFIED WHETHER ESOPHAGITIS PRESENT: ICD-10-CM

## 2025-08-22 PROCEDURE — 99214 OFFICE O/P EST MOD 30 MIN: CPT | Performed by: FAMILY MEDICINE

## 2025-08-22 RX ORDER — OMEPRAZOLE 20 MG/1
20 CAPSULE, DELAYED RELEASE ORAL DAILY
Qty: 90 CAPSULE | Refills: 0 | Status: SHIPPED | OUTPATIENT
Start: 2025-08-22

## 2025-08-22 RX ORDER — BUPROPION HYDROCHLORIDE 150 MG/1
150 TABLET ORAL DAILY
Qty: 90 TABLET | Refills: 0 | Status: SHIPPED | OUTPATIENT
Start: 2025-08-22

## 2025-08-22 RX ORDER — BUPROPION HYDROCHLORIDE 150 MG/1
150 TABLET ORAL DAILY
Qty: 90 TABLET | Refills: 0 | Status: SHIPPED | OUTPATIENT
Start: 2025-08-22 | End: 2025-08-22 | Stop reason: SDUPTHER

## 2025-08-22 RX ORDER — OMEPRAZOLE 20 MG/1
20 CAPSULE, DELAYED RELEASE ORAL DAILY
Qty: 90 CAPSULE | Refills: 0 | OUTPATIENT
Start: 2025-08-22

## 2025-08-26 ENCOUNTER — TREATMENT (OUTPATIENT)
Dept: PHYSICAL THERAPY | Facility: CLINIC | Age: 60
End: 2025-08-26
Payer: COMMERCIAL

## 2025-08-26 DIAGNOSIS — R29.898 MUSCULAR DECONDITIONING: ICD-10-CM

## 2025-08-26 DIAGNOSIS — R26.9 GAIT DISTURBANCE: Primary | ICD-10-CM

## 2025-08-26 DIAGNOSIS — R26.89 BALANCE PROBLEMS: ICD-10-CM

## 2025-08-26 PROCEDURE — 97110 THERAPEUTIC EXERCISES: CPT | Performed by: PHYSICAL THERAPIST

## 2025-08-26 PROCEDURE — 97530 THERAPEUTIC ACTIVITIES: CPT | Performed by: PHYSICAL THERAPIST

## (undated) DEVICE — ADAPT CLN SCPE ENDO PORPOISE BX/50 DISP

## (undated) DEVICE — CANN O2 ETCO2 FITS ALL CONN CO2 SMPL A/ 7IN DISP LF

## (undated) DEVICE — SYRINGE, LUER SLIP, STERILE, 60ML: Brand: MEDLINE

## (undated) DEVICE — GOWN PROC ENDOARMOR GI LVL3 HY/SHLD UNIV

## (undated) DEVICE — KT ORCA ORCAPOD DISP STRL

## (undated) DEVICE — JACKT LAB F/R KNIT CUFF/COLR XLG BLU

## (undated) DEVICE — Device

## (undated) DEVICE — FLEX ADVANTAGE 1500CC: Brand: FLEX ADVANTAGE